# Patient Record
Sex: FEMALE | Race: WHITE | NOT HISPANIC OR LATINO | Employment: PART TIME | URBAN - METROPOLITAN AREA
[De-identification: names, ages, dates, MRNs, and addresses within clinical notes are randomized per-mention and may not be internally consistent; named-entity substitution may affect disease eponyms.]

---

## 2017-04-11 ENCOUNTER — OFFICE VISIT (OUTPATIENT)
Dept: FAMILY MEDICINE CLINIC | Facility: CLINIC | Age: 47
End: 2017-04-11

## 2017-04-11 VITALS
BODY MASS INDEX: 36.63 KG/M2 | SYSTOLIC BLOOD PRESSURE: 116 MMHG | TEMPERATURE: 98.3 F | HEIGHT: 61 IN | HEART RATE: 78 BPM | DIASTOLIC BLOOD PRESSURE: 72 MMHG | WEIGHT: 194 LBS | OXYGEN SATURATION: 98 %

## 2017-04-11 DIAGNOSIS — E66.9 MILDLY OBESE: Primary | ICD-10-CM

## 2017-04-11 DIAGNOSIS — E66.09 EXOGENOUS OBESITY: ICD-10-CM

## 2017-04-11 DIAGNOSIS — F43.0 STRESS REACTION: ICD-10-CM

## 2017-04-11 PROCEDURE — 99213 OFFICE O/P EST LOW 20 MIN: CPT | Performed by: FAMILY MEDICINE

## 2017-04-11 RX ORDER — PHENTERMINE HYDROCHLORIDE 37.5 MG/1
37.5 CAPSULE ORAL EVERY MORNING
Qty: 30 CAPSULE | Refills: 1 | Status: SHIPPED | OUTPATIENT
Start: 2017-04-11 | End: 2017-05-31 | Stop reason: SDUPTHER

## 2017-04-11 NOTE — PROGRESS NOTES
Subjective   Juan Kaur is a 47 y.o. female with   Chief Complaint   Patient presents with   • Obesity     wants to go back on phentermine   .    History of Present Illness     Juan is a 47 yr old white female that presents today for follow up of Exogenous obesity.  She has been working out in a gym but in the last few months, the family stress has increased and she has not been as stringent with her diet.  She has not been using Phentermine in some time.  In these last few months she has also been lax with the use of the gym.  She has noted a 20 pound weight increased in this interim.  She has come to  with the stresses at home and is willing to return to the gym and eat better to take better care of her own body.     The following portions of the patient's history were reviewed and updated as appropriate: allergies, current medications, past family history, past medical history, past social history, past surgical history and problem list.    Review of Systems   Constitutional:        Exogenous obesity   All other systems reviewed and are negative.      Objective     Vitals:    04/11/17 1357   BP: 116/72   Pulse: 78   Temp: 98.3 °F (36.8 °C)   SpO2: 98%     BP Readings from Last 3 Encounters:   04/11/17 116/72   05/26/16 112/68   03/29/16 126/84      Wt Readings from Last 3 Encounters:   04/11/17 194 lb (88 kg)   05/26/16 173 lb (78.5 kg)   03/29/16 181 lb (82.1 kg)        Physical Exam   Constitutional: She is oriented to person, place, and time. She appears well-developed and well-nourished.   HENT:   Head: Normocephalic and atraumatic.   Neck: Trachea normal and phonation normal. Neck supple. Normal carotid pulses present. Carotid bruit is not present. No thyroid mass and no thyromegaly present.   Cardiovascular: Normal rate, regular rhythm and normal heart sounds.  Exam reveals no gallop and no friction rub.    No murmur heard.  Pulmonary/Chest: Effort normal and breath sounds normal. No respiratory  distress. She has no decreased breath sounds. She has no wheezes. She has no rhonchi. She has no rales.   Lymphadenopathy:     She has no cervical adenopathy.   Neurological: She is alert and oriented to person, place, and time.   Skin: Skin is warm and dry. No rash noted.   Psychiatric: She has a normal mood and affect. Her speech is normal and behavior is normal. Judgment and thought content normal. Cognition and memory are normal.   Nursing note and vitals reviewed.      Assessment/Plan   Juan was seen today for obesity.    Diagnoses and all orders for this visit:    Mildly obese  -     phentermine 37.5 MG capsule; Take 1 capsule by mouth Every Morning.    Stress reaction    Exogenous obesity        Return in about 2 months (around 6/11/2017).    Scribed for Catalino Katz MD by Bj Fung. 04/11/2017    ICatalino MD personally performed the services described in this documentation, as scribed by Bj Fung in my presence, and it is both accurate and complete

## 2017-04-12 PROBLEM — E66.09 EXOGENOUS OBESITY: Status: ACTIVE | Noted: 2017-04-12

## 2017-04-12 NOTE — PATIENT INSTRUCTIONS
Long discussion in regards to appropriate diet and exercise pattern.  Patient will be restarted on phentermine with appropriate instructions on use and potential side effects.  Anticipate follow-up in 2 months unless otherwise indicated.

## 2017-05-31 ENCOUNTER — OFFICE VISIT (OUTPATIENT)
Dept: FAMILY MEDICINE CLINIC | Facility: CLINIC | Age: 47
End: 2017-05-31

## 2017-05-31 VITALS
HEIGHT: 61 IN | BODY MASS INDEX: 35.12 KG/M2 | WEIGHT: 186 LBS | TEMPERATURE: 98.2 F | SYSTOLIC BLOOD PRESSURE: 110 MMHG | DIASTOLIC BLOOD PRESSURE: 70 MMHG | HEART RATE: 73 BPM | OXYGEN SATURATION: 100 %

## 2017-05-31 DIAGNOSIS — E66.9 MILDLY OBESE: ICD-10-CM

## 2017-05-31 DIAGNOSIS — E66.09 EXOGENOUS OBESITY: Primary | ICD-10-CM

## 2017-05-31 PROCEDURE — 99213 OFFICE O/P EST LOW 20 MIN: CPT | Performed by: FAMILY MEDICINE

## 2017-05-31 RX ORDER — PHENTERMINE HYDROCHLORIDE 37.5 MG/1
37.5 CAPSULE ORAL EVERY MORNING
Qty: 30 CAPSULE | Refills: 1 | Status: SHIPPED | OUTPATIENT
Start: 2017-05-31 | End: 2019-01-21

## 2017-09-22 ENCOUNTER — CLINICAL SUPPORT (OUTPATIENT)
Dept: FAMILY MEDICINE CLINIC | Facility: CLINIC | Age: 47
End: 2017-09-22

## 2017-09-22 DIAGNOSIS — Z23 NEED FOR INFLUENZA VACCINATION: Primary | ICD-10-CM

## 2017-09-22 PROCEDURE — 90471 IMMUNIZATION ADMIN: CPT | Performed by: FAMILY MEDICINE

## 2017-09-22 PROCEDURE — 90686 IIV4 VACC NO PRSV 0.5 ML IM: CPT | Performed by: FAMILY MEDICINE

## 2017-12-18 ENCOUNTER — APPOINTMENT (OUTPATIENT)
Dept: WOMENS IMAGING | Facility: HOSPITAL | Age: 47
End: 2017-12-18

## 2017-12-18 PROCEDURE — 77067 SCR MAMMO BI INCL CAD: CPT | Performed by: RADIOLOGY

## 2018-10-05 ENCOUNTER — FLU SHOT (OUTPATIENT)
Dept: FAMILY MEDICINE CLINIC | Facility: CLINIC | Age: 48
End: 2018-10-05

## 2018-10-05 DIAGNOSIS — Z23 FLU VACCINE NEED: ICD-10-CM

## 2018-10-05 PROCEDURE — 90674 CCIIV4 VAC NO PRSV 0.5 ML IM: CPT | Performed by: FAMILY MEDICINE

## 2018-10-05 PROCEDURE — 90471 IMMUNIZATION ADMIN: CPT | Performed by: FAMILY MEDICINE

## 2018-11-20 PROCEDURE — 90632 HEPA VACCINE ADULT IM: CPT | Performed by: FAMILY MEDICINE

## 2018-11-20 PROCEDURE — 90471 IMMUNIZATION ADMIN: CPT | Performed by: FAMILY MEDICINE

## 2018-12-27 DIAGNOSIS — E55.9 VITAMIN D DEFICIENCY: ICD-10-CM

## 2018-12-27 DIAGNOSIS — Z00.00 ROUTINE HEALTH MAINTENANCE: ICD-10-CM

## 2018-12-27 DIAGNOSIS — Z00.00 ENCOUNTER FOR WELL ADULT EXAM WITHOUT ABNORMAL FINDINGS: Primary | ICD-10-CM

## 2019-01-11 LAB
25(OH)D3+25(OH)D2 SERPL-MCNC: 18.6 NG/ML
ALBUMIN SERPL-MCNC: 4.3 G/DL (ref 3.5–5.2)
ALBUMIN/GLOB SERPL: 1.5 G/DL
ALP SERPL-CCNC: 84 U/L (ref 40–129)
ALT SERPL-CCNC: 22 U/L (ref 5–33)
AST SERPL-CCNC: 16 U/L (ref 5–32)
BASOPHILS # BLD AUTO: 0.04 10*3/MM3 (ref 0–0.2)
BASOPHILS NFR BLD AUTO: 0.5 % (ref 0–2)
BILIRUB SERPL-MCNC: 0.7 MG/DL (ref 0.2–1.2)
BUN SERPL-MCNC: 17 MG/DL (ref 6–20)
BUN/CREAT SERPL: 21 (ref 7–25)
CALCIUM SERPL-MCNC: 9.1 MG/DL (ref 8.6–10.5)
CHLORIDE SERPL-SCNC: 103 MMOL/L (ref 98–107)
CHOLEST SERPL-MCNC: 206 MG/DL (ref 0–200)
CO2 SERPL-SCNC: 26.5 MMOL/L (ref 22–29)
CREAT SERPL-MCNC: 0.81 MG/DL (ref 0.57–1)
EOSINOPHIL # BLD AUTO: 0.36 10*3/MM3 (ref 0.1–0.3)
EOSINOPHIL NFR BLD AUTO: 4.5 % (ref 0–4)
ERYTHROCYTE [DISTWIDTH] IN BLOOD BY AUTOMATED COUNT: 12.6 % (ref 11.5–14.5)
GLOBULIN SER CALC-MCNC: 2.9 GM/DL
GLUCOSE SERPL-MCNC: 78 MG/DL (ref 65–99)
HCT VFR BLD AUTO: 44.1 % (ref 37–47)
HDLC SERPL-MCNC: 66 MG/DL (ref 40–60)
HGB BLD-MCNC: 14.9 G/DL (ref 12–16)
IMM GRANULOCYTES # BLD AUTO: 0.01 10*3/MM3 (ref 0–0.03)
IMM GRANULOCYTES NFR BLD AUTO: 0.1 % (ref 0–0.5)
LDLC SERPL CALC-MCNC: 120 MG/DL (ref 0–100)
LYMPHOCYTES # BLD AUTO: 1.83 10*3/MM3 (ref 0.6–4.8)
LYMPHOCYTES NFR BLD AUTO: 23 % (ref 20–45)
MCH RBC QN AUTO: 29.7 PG (ref 27–31)
MCHC RBC AUTO-ENTMCNC: 33.8 G/DL (ref 31–37)
MCV RBC AUTO: 88 FL (ref 81–99)
MONOCYTES # BLD AUTO: 0.54 10*3/MM3 (ref 0–1)
MONOCYTES NFR BLD AUTO: 6.8 % (ref 3–8)
NEUTROPHILS # BLD AUTO: 5.16 10*3/MM3 (ref 1.5–8.3)
NEUTROPHILS NFR BLD AUTO: 65.1 % (ref 45–70)
NRBC BLD AUTO-RTO: 0 /100 WBC (ref 0–0)
PLATELET # BLD AUTO: 282 10*3/MM3 (ref 140–500)
POTASSIUM SERPL-SCNC: 4.6 MMOL/L (ref 3.5–5.2)
PROT SERPL-MCNC: 7.2 G/DL (ref 6–8.5)
RBC # BLD AUTO: 5.01 10*6/MM3 (ref 4.2–5.4)
SODIUM SERPL-SCNC: 140 MMOL/L (ref 136–145)
TRIGL SERPL-MCNC: 99 MG/DL (ref 0–150)
TSH SERPL DL<=0.005 MIU/L-ACNC: 2.32 MIU/ML (ref 0.27–4.2)
VLDLC SERPL CALC-MCNC: 19.8 MG/DL (ref 7–27)
WBC # BLD AUTO: 7.94 10*3/MM3 (ref 4.8–10.8)

## 2019-01-21 ENCOUNTER — OFFICE VISIT (OUTPATIENT)
Dept: FAMILY MEDICINE CLINIC | Facility: CLINIC | Age: 49
End: 2019-01-21

## 2019-01-21 VITALS
HEIGHT: 61 IN | BODY MASS INDEX: 37.76 KG/M2 | OXYGEN SATURATION: 97 % | WEIGHT: 200 LBS | SYSTOLIC BLOOD PRESSURE: 122 MMHG | RESPIRATION RATE: 16 BRPM | HEART RATE: 74 BPM | TEMPERATURE: 98.4 F | DIASTOLIC BLOOD PRESSURE: 70 MMHG

## 2019-01-21 DIAGNOSIS — E78.2 MIXED HYPERLIPIDEMIA: ICD-10-CM

## 2019-01-21 DIAGNOSIS — E55.9 VITAMIN D DEFICIENCY: ICD-10-CM

## 2019-01-21 DIAGNOSIS — J30.1 SEASONAL ALLERGIC RHINITIS DUE TO POLLEN: ICD-10-CM

## 2019-01-21 DIAGNOSIS — E66.09 EXOGENOUS OBESITY: ICD-10-CM

## 2019-01-21 DIAGNOSIS — K21.9 GASTROESOPHAGEAL REFLUX DISEASE WITHOUT ESOPHAGITIS: ICD-10-CM

## 2019-01-21 DIAGNOSIS — Z00.01 ENCOUNTER FOR WELL ADULT EXAM WITH ABNORMAL FINDINGS: Primary | ICD-10-CM

## 2019-01-21 LAB
BILIRUB BLD-MCNC: NEGATIVE MG/DL
CLARITY, POC: CLEAR
COLOR UR: YELLOW
GLUCOSE UR STRIP-MCNC: NEGATIVE MG/DL
KETONES UR QL: NEGATIVE
LEUKOCYTE EST, POC: NEGATIVE
NITRITE UR-MCNC: NEGATIVE MG/ML
PH UR: 6 [PH] (ref 5–8)
PROT UR STRIP-MCNC: NEGATIVE MG/DL
RBC # UR STRIP: ABNORMAL /UL
SP GR UR: 1.01 (ref 1–1.03)
UROBILINOGEN UR QL: NORMAL

## 2019-01-21 PROCEDURE — 81002 URINALYSIS NONAUTO W/O SCOPE: CPT | Performed by: FAMILY MEDICINE

## 2019-01-21 PROCEDURE — 99396 PREV VISIT EST AGE 40-64: CPT | Performed by: FAMILY MEDICINE

## 2019-01-21 NOTE — PROGRESS NOTES
Subjective   Juan Kaur is a 49 y.o. female with   Chief Complaint   Patient presents with   • Annual Exam     review labs   .    History of Present Illness   49-year-old white female here for routine complete physical exam.  Patient  with trouble in regards to weight.  A long history of exogenous obesity and through the holidays did not do well.  She is preparing to return to the gym and to eat better.  She has a past history of GERD, and allergic rhinitis.  Medications include Zyrtec during her allergy season and over-the-counter probiotics.  She has her own GYN and will be attending visit to this person in the near future.  The GYN does perform her mammograms.  She is still has menses although they have become him what sporadic.  She is currently otherwise doing well and has no acute complaints.  The following portions of the patient's history were reviewed and updated as appropriate: allergies, current medications, past family history, past medical history, past social history, past surgical history and problem list.    Review of Systems   Constitutional:        Exogenous obesity   Gastrointestinal:        GERD   Allergic/Immunologic: Positive for environmental allergies.       Objective     Vitals:    01/21/19 0812   BP: 122/70   Pulse: 74   Resp: 16   Temp: 98.4 °F (36.9 °C)   SpO2: 97%       Recent Results (from the past 168 hour(s))   POCT urinalysis dipstick, manual    Collection Time: 01/21/19  8:27 AM   Result Value Ref Range    Color Yellow Yellow, Straw, Dark Yellow, Zaida    Clarity, UA Clear Clear    Glucose, UA Negative Negative, 1000 mg/dL (3+) mg/dL    Bilirubin Negative Negative    Ketones, UA Negative Negative    Specific Gravity  1.010 1.005 - 1.030    Blood, UA Trace (A) Negative    pH, Urine 6.0 5.0 - 8.0    Protein, POC Negative Negative mg/dL    Urobilinogen, UA Normal Normal    Leukocytes Negative Negative    Nitrite, UA Negative Negative       Physical Exam   Constitutional: She is  oriented to person, place, and time. Vital signs are normal. She appears well-developed and well-nourished. No distress.   HENT:   Head: Normocephalic and atraumatic.   Right Ear: Hearing, tympanic membrane, external ear and ear canal normal.   Left Ear: Hearing, tympanic membrane, external ear and ear canal normal.   Nose: Nose normal.   Mouth/Throat: Uvula is midline, oropharynx is clear and moist and mucous membranes are normal.   Eyes: Conjunctivae, EOM and lids are normal. Pupils are equal, round, and reactive to light. No scleral icterus.   Fundoscopic exam:       The right eye shows no AV nicking, no exudate, no hemorrhage and no papilledema.        The left eye shows no AV nicking, no exudate, no hemorrhage and no papilledema.   Neck: Trachea normal and normal range of motion. Neck supple. No JVD present. No muscular tenderness present. Carotid bruit is not present. Normal range of motion present. No thyroid mass and no thyromegaly present.   Cardiovascular: Normal rate, regular rhythm, S1 normal, S2 normal, normal heart sounds, intact distal pulses and normal pulses. PMI is not displaced. Exam reveals no gallop and no friction rub.   No murmur heard.  Pulses:       Carotid pulses are 2+ on the right side, and 2+ on the left side.       Radial pulses are 2+ on the right side, and 2+ on the left side.   Pulmonary/Chest: Effort normal and breath sounds normal. She has no decreased breath sounds. She has no wheezes. She has no rhonchi. She has no rales.   Abdominal: Soft. Bowel sounds are normal. She exhibits no distension and no mass. There is no hepatosplenomegaly. There is no tenderness. There is no rigidity, no rebound, no guarding and no CVA tenderness. A hernia is present.   Musculoskeletal: Normal range of motion. She exhibits no edema, tenderness or deformity.   Lymphadenopathy:     She has no cervical adenopathy.   Neurological: She is alert and oriented to person, place, and time. She has normal  strength and normal reflexes. She displays no tremor and normal reflexes. No cranial nerve deficit or sensory deficit. She exhibits normal muscle tone. She displays a negative Romberg sign. Coordination and gait normal.   Reflex Scores:       Bicep reflexes are 2+ on the right side and 2+ on the left side.       Brachioradialis reflexes are 2+ on the right side and 2+ on the left side.       Patellar reflexes are 2+ on the right side and 2+ on the left side.  Skin: Skin is warm and dry. No rash noted.   Psychiatric: She has a normal mood and affect. Her speech is normal and behavior is normal. Judgment and thought content normal. Cognition and memory are normal.   Nursing note and vitals reviewed.    Office Visit on 01/21/2019   Component Date Value Ref Range Status   • Color 01/21/2019 Yellow  Yellow, Straw, Dark Yellow, Zaida Final   • Clarity, UA 01/21/2019 Clear  Clear Final   • Glucose, UA 01/21/2019 Negative  Negative, 1000 mg/dL (3+) mg/dL Final   • Bilirubin 01/21/2019 Negative  Negative Final   • Ketones, UA 01/21/2019 Negative  Negative Final   • Specific Gravity  01/21/2019 1.010  1.005 - 1.030 Final   • Blood, UA 01/21/2019 Trace* Negative Final   • pH, Urine 01/21/2019 6.0  5.0 - 8.0 Final   • Protein, POC 01/21/2019 Negative  Negative mg/dL Final   • Urobilinogen, UA 01/21/2019 Normal  Normal Final   • Leukocytes 01/21/2019 Negative  Negative Final   • Nitrite, UA 01/21/2019 Negative  Negative Final   Orders Only on 12/27/2018   Component Date Value Ref Range Status   • WBC 01/11/2019 7.94  4.80 - 10.80 10*3/mm3 Final   • RBC 01/11/2019 5.01  4.20 - 5.40 10*6/mm3 Final   • Hemoglobin 01/11/2019 14.9  12.0 - 16.0 g/dL Final   • Hematocrit 01/11/2019 44.1  37.0 - 47.0 % Final   • MCV 01/11/2019 88.0  81.0 - 99.0 fL Final   • MCH 01/11/2019 29.7  27.0 - 31.0 pg Final   • MCHC 01/11/2019 33.8  31.0 - 37.0 g/dL Final   • RDW 01/11/2019 12.6  11.5 - 14.5 % Final   • Platelets 01/11/2019 282  140 - 500  10*3/mm3 Final   • Neutrophil Rel % 01/11/2019 65.1  45.0 - 70.0 % Final   • Lymphocyte Rel % 01/11/2019 23.0  20.0 - 45.0 % Final   • Monocyte Rel % 01/11/2019 6.8  3.0 - 8.0 % Final   • Eosinophil Rel % 01/11/2019 4.5* 0.0 - 4.0 % Final   • Basophil Rel % 01/11/2019 0.5  0.0 - 2.0 % Final   • Neutrophils Absolute 01/11/2019 5.16  1.50 - 8.30 10*3/mm3 Final   • Lymphocytes Absolute 01/11/2019 1.83  0.60 - 4.80 10*3/mm3 Final   • Monocytes Absolute 01/11/2019 0.54  0.00 - 1.00 10*3/mm3 Final   • Eosinophils Absolute 01/11/2019 0.36* 0.10 - 0.30 10*3/mm3 Final   • Basophils Absolute 01/11/2019 0.04  0.00 - 0.20 10*3/mm3 Final   • Immature Granulocyte Rel % 01/11/2019 0.1  0.0 - 0.5 % Final   • Immature Grans Absolute 01/11/2019 0.01  0.00 - 0.03 10*3/mm3 Final   • nRBC 01/11/2019 0.0  0.0 - 0.0 /100 WBC Final   • Glucose 01/11/2019 78  65 - 99 mg/dL Final   • BUN 01/11/2019 17  6 - 20 mg/dL Final   • Creatinine 01/11/2019 0.81  0.57 - 1.00 mg/dL Final   • eGFR Non African Am 01/11/2019 75  >60 mL/min/1.73 Final   • eGFR African Am 01/11/2019 91  >60 mL/min/1.73 Final   • BUN/Creatinine Ratio 01/11/2019 21.0  7.0 - 25.0 Final   • Sodium 01/11/2019 140  136 - 145 mmol/L Final   • Potassium 01/11/2019 4.6  3.5 - 5.2 mmol/L Final   • Chloride 01/11/2019 103  98 - 107 mmol/L Final   • Total CO2 01/11/2019 26.5  22.0 - 29.0 mmol/L Final   • Calcium 01/11/2019 9.1  8.6 - 10.5 mg/dL Final   • Total Protein 01/11/2019 7.2  6.0 - 8.5 g/dL Final   • Albumin 01/11/2019 4.30  3.50 - 5.20 g/dL Final   • Globulin 01/11/2019 2.9  gm/dL Final   • A/G Ratio 01/11/2019 1.5  g/dL Final   • Total Bilirubin 01/11/2019 0.7  0.2 - 1.2 mg/dL Final   • Alkaline Phosphatase 01/11/2019 84  40 - 129 U/L Final   • AST (SGOT) 01/11/2019 16  5 - 32 U/L Final   • ALT (SGPT) 01/11/2019 22  5 - 33 U/L Final   • Total Cholesterol 01/11/2019 206* 0 - 200 mg/dL Final   • Triglycerides 01/11/2019 99  0 - 150 mg/dL Final   • HDL Cholesterol 01/11/2019 66*  40 - 60 mg/dL Final   • VLDL Cholesterol 01/11/2019 19.8  7 - 27 mg/dL Final   • LDL Cholesterol  01/11/2019 120* 0 - 100 mg/dL Final   • TSH 01/11/2019 2.320  0.270 - 4.200 mIU/mL Final   • 25 Hydroxy, Vitamin D 01/11/2019 18.6  ng/ml Final    Comment: Reference Range for Total Vitamin D 25(OH)  Deficiency <20.0 ng/mL  Insufficiency 21-29 ng/mL  Sufficiency  ng/mL  Toxicity >100 ng/mL           Assessment/Plan   Juan was seen today for annual exam.    Diagnoses and all orders for this visit:    Encounter for well adult exam with abnormal findings  -     POCT urinalysis dipstick, manual  -     Urine Culture - Urine, Urine, Clean Catch  -     CBC & Differential; Future  -     Comprehensive Metabolic Panel; Future    Exogenous obesity  -     CBC & Differential; Future  -     Comprehensive Metabolic Panel; Future  -     TSH; Future    Gastroesophageal reflux disease without esophagitis  -     CBC & Differential; Future  -     Comprehensive Metabolic Panel; Future    Seasonal allergic rhinitis due to pollen  -     CBC & Differential; Future  -     Comprehensive Metabolic Panel; Future    Vitamin D deficiency  -     CBC & Differential; Future  -     Comprehensive Metabolic Panel; Future  -     Vitamin D 25 Hydroxy; Future    Mixed hyperlipidemia  -     CBC & Differential; Future  -     Comprehensive Metabolic Panel; Future  -     Lipid Panel; Future        Return in about 6 months (around 7/21/2019) for Recheck.

## 2019-01-23 LAB
BACTERIA UR CULT: ABNORMAL
BACTERIA UR CULT: ABNORMAL

## 2019-01-23 RX ORDER — AMPICILLIN 500 MG/1
500 CAPSULE ORAL 4 TIMES DAILY
Qty: 20 CAPSULE | Refills: 0 | Status: SHIPPED | OUTPATIENT
Start: 2019-01-23 | End: 2019-08-13

## 2019-07-20 ENCOUNTER — RESULTS ENCOUNTER (OUTPATIENT)
Dept: FAMILY MEDICINE CLINIC | Facility: CLINIC | Age: 49
End: 2019-07-20

## 2019-07-20 DIAGNOSIS — J30.1 SEASONAL ALLERGIC RHINITIS DUE TO POLLEN: ICD-10-CM

## 2019-07-20 DIAGNOSIS — E66.09 EXOGENOUS OBESITY: ICD-10-CM

## 2019-07-20 DIAGNOSIS — Z00.01 ENCOUNTER FOR WELL ADULT EXAM WITH ABNORMAL FINDINGS: ICD-10-CM

## 2019-07-20 DIAGNOSIS — K21.9 GASTROESOPHAGEAL REFLUX DISEASE WITHOUT ESOPHAGITIS: ICD-10-CM

## 2019-07-20 DIAGNOSIS — E78.2 MIXED HYPERLIPIDEMIA: ICD-10-CM

## 2019-07-20 DIAGNOSIS — E55.9 VITAMIN D DEFICIENCY: ICD-10-CM

## 2019-08-13 ENCOUNTER — OFFICE VISIT (OUTPATIENT)
Dept: FAMILY MEDICINE CLINIC | Facility: CLINIC | Age: 49
End: 2019-08-13

## 2019-08-13 VITALS
HEIGHT: 61 IN | HEART RATE: 79 BPM | SYSTOLIC BLOOD PRESSURE: 122 MMHG | TEMPERATURE: 98 F | DIASTOLIC BLOOD PRESSURE: 74 MMHG | WEIGHT: 191 LBS | BODY MASS INDEX: 36.06 KG/M2 | OXYGEN SATURATION: 98 %

## 2019-08-13 DIAGNOSIS — L28.0 LICHEN SIMPLEX CHRONICUS: ICD-10-CM

## 2019-08-13 DIAGNOSIS — B37.31 VAGINAL MONILIASIS: ICD-10-CM

## 2019-08-13 DIAGNOSIS — R30.0 DYSURIA: Primary | ICD-10-CM

## 2019-08-13 LAB
BILIRUB BLD-MCNC: ABNORMAL MG/DL
CLARITY, POC: CLEAR
COLOR UR: ABNORMAL
GLUCOSE UR STRIP-MCNC: NEGATIVE MG/DL
KETONES UR QL: ABNORMAL
LEUKOCYTE EST, POC: NEGATIVE
NITRITE UR-MCNC: NEGATIVE MG/ML
PH UR: 5 [PH] (ref 5–8)
PROT UR STRIP-MCNC: ABNORMAL MG/DL
RBC # UR STRIP: NEGATIVE /UL
SP GR UR: 1.02 (ref 1–1.03)
UROBILINOGEN UR QL: NORMAL

## 2019-08-13 PROCEDURE — 81002 URINALYSIS NONAUTO W/O SCOPE: CPT | Performed by: FAMILY MEDICINE

## 2019-08-13 PROCEDURE — 99213 OFFICE O/P EST LOW 20 MIN: CPT | Performed by: FAMILY MEDICINE

## 2019-08-13 RX ORDER — FLUCONAZOLE 150 MG/1
150 TABLET ORAL ONCE
Qty: 1 TABLET | Refills: 0 | Status: SHIPPED | OUTPATIENT
Start: 2019-08-13 | End: 2019-08-13

## 2019-08-13 RX ORDER — BETAMETHASONE DIPROPIONATE 0.5 MG/G
CREAM TOPICAL 2 TIMES DAILY
COMMUNITY
End: 2020-01-20

## 2019-08-14 NOTE — PATIENT INSTRUCTIONS
Diflucan will be prescribed to take 1 times today.  She will also return to betamethasone cream which she has plenty of at the house.  Urine culture will also be obtained to cover any infectious urinary tract issues.

## 2019-08-14 NOTE — PROGRESS NOTES
Subjective   Juan Kaur is a 49 y.o. female with   Chief Complaint   Patient presents with   • Difficulty Urinating   • Abdominal Cramping   • Vaginal Itching     had a cream from GYN in the past, betamethasone cream  0.05%, uses off and on   .    History of Present Illness   49-year-old white female with perivaginal pruritus especially in the superior portion of the pubic hair exist.  She has been given betamethasone cream in the past by her GYN.  She denies vaginal discharge and states that there is no intravaginal pruritus.  She does have some lower abdominal cramping and on occasion will have episodes of dysuria that seem to be at the end of micturition.  This is however not the norm and she also denies hematuria, frequency or urgency.  The following portions of the patient's history were reviewed and updated as appropriate: allergies, current medications, past family history, past medical history, past social history, past surgical history and problem list.    Review of Systems   Genitourinary: Positive for dysuria.        Perivaginal pruritus       Objective     Vitals:    08/13/19 0812   BP: 122/74   Pulse: 79   Temp: 98 °F (36.7 °C)   SpO2: 98%       Recent Results (from the past 672 hour(s))   POC Urinalysis Dipstick    Collection Time: 08/13/19  8:23 AM   Result Value Ref Range    Color Straw Yellow, Straw, Dark Yellow, Zaida    Clarity, UA Clear Clear    Glucose, UA Negative Negative, 1000 mg/dL (3+) mg/dL    Bilirubin 1 mg/dL (A) Negative    Ketones, UA 1+ (A) Negative    Specific Gravity  1.025 1.005 - 1.030    Blood, UA Negative Negative    pH, Urine 5.0 5.0 - 8.0    Protein, POC Trace (A) Negative mg/dL    Urobilinogen, UA Normal Normal    Leukocytes Negative Negative    Nitrite, UA Negative Negative       Physical Exam   Constitutional: She is oriented to person, place, and time. She appears well-developed and well-nourished.   HENT:   Head: Normocephalic and atraumatic.   Neck: Trachea normal  and phonation normal. Neck supple. Normal carotid pulses present. Carotid bruit is not present. No thyroid mass and no thyromegaly present.   Cardiovascular: Normal rate, regular rhythm and normal heart sounds. Exam reveals no gallop and no friction rub.   No murmur heard.  Pulmonary/Chest: Effort normal and breath sounds normal. No respiratory distress. She has no decreased breath sounds. She has no wheezes. She has no rhonchi. She has no rales.   Lymphadenopathy:     She has no cervical adenopathy.   Neurological: She is alert and oriented to person, place, and time.   Skin: Skin is warm and dry. No rash noted.   Psychiatric: She has a normal mood and affect. Her speech is normal and behavior is normal. Judgment and thought content normal. Cognition and memory are normal.   Nursing note and vitals reviewed.      Assessment/Plan   Juan was seen today for difficulty urinating, abdominal cramping and vaginal itching.    Diagnoses and all orders for this visit:    Dysuria  -     POC Urinalysis Dipstick  -     Urine Culture - Urine, Urine, Clean Catch    Vaginal moniliasis    Lichen simplex chronicus    Other orders  -     fluconazole (DIFLUCAN) 150 MG tablet; Take 1 tablet by mouth 1 (One) Time for 1 dose.        Return if symptoms worsen or fail to improve.

## 2019-08-15 LAB
BACTERIA UR CULT: NORMAL
BACTERIA UR CULT: NORMAL

## 2019-09-26 ENCOUNTER — FLU SHOT (OUTPATIENT)
Dept: FAMILY MEDICINE CLINIC | Facility: CLINIC | Age: 49
End: 2019-09-26

## 2019-09-26 DIAGNOSIS — Z23 NEED FOR INFLUENZA VACCINATION: Primary | ICD-10-CM

## 2019-09-26 DIAGNOSIS — Z23 FLU VACCINE NEED: ICD-10-CM

## 2019-09-26 PROCEDURE — 90471 IMMUNIZATION ADMIN: CPT | Performed by: FAMILY MEDICINE

## 2019-09-26 PROCEDURE — 90674 CCIIV4 VAC NO PRSV 0.5 ML IM: CPT | Performed by: FAMILY MEDICINE

## 2019-10-31 ENCOUNTER — APPOINTMENT (OUTPATIENT)
Dept: WOMENS IMAGING | Facility: HOSPITAL | Age: 49
End: 2019-10-31

## 2019-11-05 PROCEDURE — 77067 SCR MAMMO BI INCL CAD: CPT | Performed by: RADIOLOGY

## 2020-01-11 LAB
25(OH)D3+25(OH)D2 SERPL-MCNC: 49.7 NG/ML (ref 30–100)
ALBUMIN SERPL-MCNC: 4 G/DL (ref 3.5–5.5)
ALBUMIN/GLOB SERPL: 1.5 {RATIO} (ref 1.2–2.2)
ALP SERPL-CCNC: 75 IU/L (ref 39–117)
ALT SERPL-CCNC: 17 IU/L (ref 0–32)
AST SERPL-CCNC: 18 IU/L (ref 0–40)
BASOPHILS # BLD AUTO: 0 X10E3/UL (ref 0–0.2)
BASOPHILS NFR BLD AUTO: 0 %
BILIRUB SERPL-MCNC: 0.6 MG/DL (ref 0–1.2)
BUN SERPL-MCNC: 15 MG/DL (ref 6–24)
BUN/CREAT SERPL: 16 (ref 9–23)
CALCIUM SERPL-MCNC: 9.4 MG/DL (ref 8.7–10.2)
CHLORIDE SERPL-SCNC: 104 MMOL/L (ref 96–106)
CHOLEST SERPL-MCNC: 191 MG/DL (ref 100–199)
CO2 SERPL-SCNC: 25 MMOL/L (ref 20–29)
CREAT SERPL-MCNC: 0.96 MG/DL (ref 0.57–1)
EOSINOPHIL # BLD AUTO: 0.3 X10E3/UL (ref 0–0.4)
EOSINOPHIL NFR BLD AUTO: 3 %
ERYTHROCYTE [DISTWIDTH] IN BLOOD BY AUTOMATED COUNT: 12.4 % (ref 11.7–15.4)
GLOBULIN SER CALC-MCNC: 2.6 G/DL (ref 1.5–4.5)
GLUCOSE SERPL-MCNC: 77 MG/DL (ref 65–99)
HCT VFR BLD AUTO: 42.2 % (ref 34–46.6)
HDLC SERPL-MCNC: 59 MG/DL
HGB BLD-MCNC: 14.1 G/DL (ref 11.1–15.9)
IMM GRANULOCYTES # BLD AUTO: 0 X10E3/UL (ref 0–0.1)
IMM GRANULOCYTES NFR BLD AUTO: 0 %
LDLC SERPL CALC-MCNC: 119 MG/DL (ref 0–99)
LYMPHOCYTES # BLD AUTO: 1.6 X10E3/UL (ref 0.7–3.1)
LYMPHOCYTES NFR BLD AUTO: 20 %
MCH RBC QN AUTO: 29.3 PG (ref 26.6–33)
MCHC RBC AUTO-ENTMCNC: 33.4 G/DL (ref 31.5–35.7)
MCV RBC AUTO: 88 FL (ref 79–97)
MONOCYTES # BLD AUTO: 0.7 X10E3/UL (ref 0.1–0.9)
MONOCYTES NFR BLD AUTO: 8 %
NEUTROPHILS # BLD AUTO: 5.6 X10E3/UL (ref 1.4–7)
NEUTROPHILS NFR BLD AUTO: 69 %
PLATELET # BLD AUTO: 288 X10E3/UL (ref 150–450)
POTASSIUM SERPL-SCNC: 4.6 MMOL/L (ref 3.5–5.2)
PROT SERPL-MCNC: 6.6 G/DL (ref 6–8.5)
RBC # BLD AUTO: 4.81 X10E6/UL (ref 3.77–5.28)
SODIUM SERPL-SCNC: 143 MMOL/L (ref 134–144)
TRIGL SERPL-MCNC: 66 MG/DL (ref 0–149)
TSH SERPL DL<=0.005 MIU/L-ACNC: 2.02 UIU/ML (ref 0.45–4.5)
VLDLC SERPL CALC-MCNC: 13 MG/DL (ref 5–40)
WBC # BLD AUTO: 8.2 X10E3/UL (ref 3.4–10.8)

## 2020-01-20 ENCOUNTER — OFFICE VISIT (OUTPATIENT)
Dept: FAMILY MEDICINE CLINIC | Facility: CLINIC | Age: 50
End: 2020-01-20

## 2020-01-20 VITALS
TEMPERATURE: 98 F | HEIGHT: 61 IN | SYSTOLIC BLOOD PRESSURE: 124 MMHG | OXYGEN SATURATION: 99 % | WEIGHT: 183 LBS | BODY MASS INDEX: 34.55 KG/M2 | DIASTOLIC BLOOD PRESSURE: 80 MMHG | HEART RATE: 69 BPM

## 2020-01-20 DIAGNOSIS — F43.21 GRIEVING: ICD-10-CM

## 2020-01-20 DIAGNOSIS — F43.0 STRESS REACTION: ICD-10-CM

## 2020-01-20 DIAGNOSIS — J06.9 VIRAL URI WITH COUGH: Primary | ICD-10-CM

## 2020-01-20 DIAGNOSIS — E78.2 MIXED HYPERLIPIDEMIA: ICD-10-CM

## 2020-01-20 DIAGNOSIS — E66.09 EXOGENOUS OBESITY: ICD-10-CM

## 2020-01-20 DIAGNOSIS — E55.9 VITAMIN D DEFICIENCY: ICD-10-CM

## 2020-01-20 PROCEDURE — 99214 OFFICE O/P EST MOD 30 MIN: CPT | Performed by: FAMILY MEDICINE

## 2020-01-20 RX ORDER — FLUTICASONE PROPIONATE 50 MCG
2 SPRAY, SUSPENSION (ML) NASAL DAILY
COMMUNITY

## 2020-01-20 RX ORDER — ESTRADIOL 0.1 MG/G
CREAM VAGINAL
COMMUNITY
Start: 2019-10-31

## 2020-01-21 NOTE — PROGRESS NOTES
Subjective   Juan Kaur is a 50 y.o. female with   Chief Complaint   Patient presents with   • Hyperlipidemia   • Nasal Congestion   • Cough   • Earache   .    History of Present Illness   50-year-old white female with nasal congestion, postnasal drip and increased cough over the last 4 to 5 days.  Patient denies fever and there has been no chest pain, shortness of air or audible wheezing.  She has had some ear discomfort in the right ear although denies drainage.  Cough for the most part has been nonproductive.  Patient denies myalgias and there have been no issues with nausea, vomiting or diarrhea.  Patient also with a history of hyperlipidemia here in follow-up with fasting labs drawn prior to this appointment.  Patient has been attempting to control lipid status with diet alone.  She is still struggling with the loss of her brother who passed away 4 to 5 months ago.  Her father has end-stage Parkinson's disease and is severely demented.  She understands she may also be facing his demise in the not too distant future.  She has 1 other brother who lives in the [facility] and she rarely sees him.  She is somewhat the sole support for her mother who is also grieving the death of her son.  She does wonder if she needs to be on an antidepressant although with further conversation realizes that she is not suffering from depression.  Patient denies suicidal or homicidal ideation.  The following portions of the patient's history were reviewed and updated as appropriate: allergies, current medications, past family history, past medical history, past social history, past surgical history and problem list.    Review of Systems   Constitutional: Positive for fatigue. Negative for fever.   HENT: Positive for congestion, ear pain and postnasal drip. Negative for ear discharge and sore throat.    Respiratory: Positive for cough. Negative for shortness of breath and wheezing.    Cardiovascular: Negative for chest  pain.   Gastrointestinal: Negative for diarrhea, nausea and vomiting.   Musculoskeletal: Negative for myalgias.   Psychiatric/Behavioral: Positive for dysphoric mood.       Objective     Vitals:    01/20/20 1017   BP: 124/80   Pulse: 69   Temp: 98 °F (36.7 °C)   SpO2: 99%       Recent Results (from the past 672 hour(s))   CBC & Differential    Collection Time: 01/10/20  9:14 AM   Result Value Ref Range    WBC 8.2 3.4 - 10.8 x10E3/uL    RBC 4.81 3.77 - 5.28 x10E6/uL    Hemoglobin 14.1 11.1 - 15.9 g/dL    Hematocrit 42.2 34.0 - 46.6 %    MCV 88 79 - 97 fL    MCH 29.3 26.6 - 33.0 pg    MCHC 33.4 31.5 - 35.7 g/dL    RDW 12.4 11.7 - 15.4 %    Platelets 288 150 - 450 x10E3/uL    Neutrophil Rel % 69 Not Estab. %    Lymphocyte Rel % 20 Not Estab. %    Monocyte Rel % 8 Not Estab. %    Eosinophil Rel % 3 Not Estab. %    Basophil Rel % 0 Not Estab. %    Neutrophils Absolute 5.6 1.4 - 7.0 x10E3/uL    Lymphocytes Absolute 1.6 0.7 - 3.1 x10E3/uL    Monocytes Absolute 0.7 0.1 - 0.9 x10E3/uL    Eosinophils Absolute 0.3 0.0 - 0.4 x10E3/uL    Basophils Absolute 0.0 0.0 - 0.2 x10E3/uL    Immature Granulocyte Rel % 0 Not Estab. %    Immature Grans Absolute 0.0 0.0 - 0.1 x10E3/uL   Comprehensive Metabolic Panel    Collection Time: 01/10/20  9:14 AM   Result Value Ref Range    Glucose 77 65 - 99 mg/dL    BUN 15 6 - 24 mg/dL    Creatinine 0.96 0.57 - 1.00 mg/dL    eGFR Non African Am 69 >59 mL/min/1.73    eGFR African Am 80 >59 mL/min/1.73    BUN/Creatinine Ratio 16 9 - 23    Sodium 143 134 - 144 mmol/L    Potassium 4.6 3.5 - 5.2 mmol/L    Chloride 104 96 - 106 mmol/L    Total CO2 25 20 - 29 mmol/L    Calcium 9.4 8.7 - 10.2 mg/dL    Total Protein 6.6 6.0 - 8.5 g/dL    Albumin 4.0 3.5 - 5.5 g/dL    Globulin 2.6 1.5 - 4.5 g/dL    A/G Ratio 1.5 1.2 - 2.2    Total Bilirubin 0.6 0.0 - 1.2 mg/dL    Alkaline Phosphatase 75 39 - 117 IU/L    AST (SGOT) 18 0 - 40 IU/L    ALT (SGPT) 17 0 - 32 IU/L   Lipid Panel    Collection Time: 01/10/20  9:14 AM    Result Value Ref Range    Total Cholesterol 191 100 - 199 mg/dL    Triglycerides 66 0 - 149 mg/dL    HDL Cholesterol 59 >39 mg/dL    VLDL Cholesterol 13 5 - 40 mg/dL    LDL Cholesterol  119 (H) 0 - 99 mg/dL   Vitamin D 25 Hydroxy    Collection Time: 01/10/20  9:14 AM   Result Value Ref Range    25 Hydroxy, Vitamin D 49.7 30.0 - 100.0 ng/mL   TSH    Collection Time: 01/10/20  9:14 AM   Result Value Ref Range    TSH 2.020 0.450 - 4.500 uIU/mL       Physical Exam   Constitutional: She is oriented to person, place, and time. She appears well-developed and well-nourished.   Obese with a BMI of 34.6   HENT:   Head: Normocephalic and atraumatic.   Right Ear: Hearing, tympanic membrane, external ear and ear canal normal.   Left Ear: Hearing, tympanic membrane, external ear and ear canal normal.   Nose: Nose normal.   Mouth/Throat: Uvula is midline, oropharynx is clear and moist and mucous membranes are normal.   Neck: Trachea normal and phonation normal. Neck supple. Normal carotid pulses present. Carotid bruit is not present. No thyroid mass and no thyromegaly present.   Cardiovascular: Normal rate, regular rhythm and normal heart sounds. Exam reveals no gallop and no friction rub.   No murmur heard.  Pulmonary/Chest: Effort normal and breath sounds normal. No respiratory distress. She has no decreased breath sounds. She has no wheezes. She has no rhonchi. She has no rales.   Lymphadenopathy:     She has no cervical adenopathy.   Neurological: She is alert and oriented to person, place, and time.   Skin: Skin is warm and dry. No rash noted.   Psychiatric: She has a normal mood and affect. Her speech is normal and behavior is normal. Judgment and thought content normal. Cognition and memory are normal.   Patient cries through much of this interview.   Nursing note and vitals reviewed.      Assessment/Plan   Juan was seen today for hyperlipidemia, nasal congestion, cough and earache.    Diagnoses and all orders for this  visit:    Viral URI with cough    Mixed hyperlipidemia    Exogenous obesity    Vitamin D deficiency    Grieving    Stress reaction    20 minutes of a 30-minute appointment spent counseling in regards to grieving and stress reaction versus depression with anxiety features.  Patient has been recommended to acquire a counselor/grief counselor.  It also is recommended that she lower cholesterol in her diet.  Her efforts at weight loss are appreciated and should continue.  Anticipate next laboratory evaluation in 6 months.  She has been reassured given the viral nature of her respiratory situation and will contact this office and 1 week if symptoms fail to show improvement.    Return in about 6 months (around 7/20/2020) for Recheck.

## 2020-02-03 ENCOUNTER — TELEPHONE (OUTPATIENT)
Dept: FAMILY MEDICINE CLINIC | Facility: CLINIC | Age: 50
End: 2020-02-03

## 2020-02-03 RX ORDER — ESCITALOPRAM OXALATE 10 MG/1
10 TABLET ORAL DAILY
Qty: 30 TABLET | Refills: 0 | Status: SHIPPED | OUTPATIENT
Start: 2020-02-03 | End: 2020-02-26

## 2020-02-03 NOTE — TELEPHONE ENCOUNTER
Pt saw you on the 20th and you had discussed starting her on anxiety medication which she states she declined at the time but has since changed her mind.  Will you go ahead and start her on something for anxiety?

## 2020-02-26 RX ORDER — ESCITALOPRAM OXALATE 10 MG/1
TABLET ORAL
Qty: 30 TABLET | Refills: 0 | Status: SHIPPED | OUTPATIENT
Start: 2020-02-26 | End: 2020-02-26 | Stop reason: SDUPTHER

## 2020-02-26 RX ORDER — ESCITALOPRAM OXALATE 10 MG/1
10 TABLET ORAL DAILY
Qty: 30 TABLET | Refills: 0 | Status: SHIPPED | OUTPATIENT
Start: 2020-02-26 | End: 2020-03-09 | Stop reason: SDUPTHER

## 2020-03-09 ENCOUNTER — OFFICE VISIT (OUTPATIENT)
Dept: FAMILY MEDICINE CLINIC | Facility: CLINIC | Age: 50
End: 2020-03-09

## 2020-03-09 VITALS
HEIGHT: 61 IN | WEIGHT: 181 LBS | SYSTOLIC BLOOD PRESSURE: 124 MMHG | OXYGEN SATURATION: 99 % | DIASTOLIC BLOOD PRESSURE: 80 MMHG | BODY MASS INDEX: 34.17 KG/M2 | HEART RATE: 83 BPM

## 2020-03-09 DIAGNOSIS — F41.1 GENERALIZED ANXIETY DISORDER: Primary | ICD-10-CM

## 2020-03-09 PROCEDURE — 99213 OFFICE O/P EST LOW 20 MIN: CPT | Performed by: FAMILY MEDICINE

## 2020-03-09 RX ORDER — ESCITALOPRAM OXALATE 10 MG/1
10 TABLET ORAL DAILY
Qty: 90 TABLET | Refills: 0 | Status: SHIPPED | OUTPATIENT
Start: 2020-03-09 | End: 2020-06-09 | Stop reason: SDUPTHER

## 2020-03-09 NOTE — PROGRESS NOTES
Subjective   Juan Kaur is a 50 y.o. female with   Chief Complaint   Patient presents with   • Anxiety     follow up Lexapro   .    History of Present Illness   Follow-up 50-year-old white female with history of general anxiety disorder.  Patient is under marked stress with the recent death of a older brother as well as trying to care for her elderly mother and father-father has end-stage Parkinson's disease.  Mother takes care of the father and then requires patient to take care of her.  She has been using Lexapro at 10 mg daily over the last month and notes increase improvement in control of anxiety.  She states she no longer wants to crawl out of her skin and she is able to handle stress in a much improved fashion.  She continues to work full-time and care for her  and children.  Patient denies suicide or homicidal ideation.  She denies any depressed symptoms and states in general she has good energy, motivation and good concentration.  She does participate in activities of usual enjoyment and denies increased agitation or irritability.  She sleeps well and has a normal appetite.  The following portions of the patient's history were reviewed and updated as appropriate: allergies, current medications, past family history, past medical history, past social history, past surgical history and problem list.    Review of Systems   Psychiatric/Behavioral: The patient is nervous/anxious.        Objective     Vitals:    03/09/20 0757   BP: 124/80   Pulse: 83   SpO2: 99%       No results found for this or any previous visit (from the past 672 hour(s)).    Physical Exam   Constitutional: She is oriented to person, place, and time. She appears well-developed and well-nourished.   HENT:   Head: Normocephalic and atraumatic.   Neck: Neck supple.   Neurological: She is alert and oriented to person, place, and time.   Skin: Skin is warm and dry. No rash noted.   Psychiatric: She has a normal mood and affect. Her  speech is normal and behavior is normal. Judgment and thought content normal. Cognition and memory are normal.   Nursing note and vitals reviewed.      Assessment/Plan   Juan was seen today for anxiety.    Diagnoses and all orders for this visit:    Generalized anxiety disorder  -     escitalopram (LEXAPRO) 10 MG tablet; Take 1 tablet by mouth Daily.        Return in about 3 months (around 6/9/2020) for Recheck.

## 2020-06-09 ENCOUNTER — HOSPITAL ENCOUNTER (OUTPATIENT)
Dept: GENERAL RADIOLOGY | Facility: HOSPITAL | Age: 50
Discharge: HOME | End: 2020-06-09
Admitting: FAMILY MEDICINE

## 2020-06-09 ENCOUNTER — OFFICE VISIT (OUTPATIENT)
Dept: FAMILY MEDICINE CLINIC | Facility: CLINIC | Age: 50
End: 2020-06-09

## 2020-06-09 VITALS
WEIGHT: 181 LBS | TEMPERATURE: 97.8 F | HEART RATE: 67 BPM | BODY MASS INDEX: 34.17 KG/M2 | HEIGHT: 61 IN | SYSTOLIC BLOOD PRESSURE: 118 MMHG | DIASTOLIC BLOOD PRESSURE: 78 MMHG | OXYGEN SATURATION: 97 %

## 2020-06-09 DIAGNOSIS — M79.674 PAIN OF RIGHT GREAT TOE: ICD-10-CM

## 2020-06-09 DIAGNOSIS — F41.1 GENERALIZED ANXIETY DISORDER: Primary | ICD-10-CM

## 2020-06-09 PROCEDURE — 73660 X-RAY EXAM OF TOE(S): CPT

## 2020-06-09 PROCEDURE — 99213 OFFICE O/P EST LOW 20 MIN: CPT | Performed by: FAMILY MEDICINE

## 2020-06-09 RX ORDER — ESCITALOPRAM OXALATE 10 MG/1
10 TABLET ORAL DAILY
Qty: 90 TABLET | Refills: 1 | Status: SHIPPED | OUTPATIENT
Start: 2020-06-09 | End: 2020-08-10 | Stop reason: SINTOL

## 2020-06-09 NOTE — PROGRESS NOTES
Subjective   Juan Kaur is a 50 y.o. female with   Chief Complaint   Patient presents with   • Anxiety     follow up Lexapro   • Toe Pain     right great toe   .    History of Present Illness   50-year-old white female with history of general anxiety disorder here for further medical management.  Patient has been using Lexapro at 10 mg daily and in general states that this has been very effective.  Her anxiety has been well controlled even though she has episodes of increased stress.  Her father has end-stage Parkinson's and it is unknown how long he will last.  She is also 1 of the main supports for her mother who is dealing with the above issues.  She lost her brother 1 year ago and was able to recover adequately.  She states there have been no side effects with the use of this medication.  She denies suicidal or homicidal ideation.  In general she has good energy, motivation and good concentration.  She is sleeping well and has a normal appetite.  She denies increased irritability or easy agitation.  She does participate in activities of usual enjoyment.  She also complains of pain at the base of her right great toe.  She does state that this was injured in her distant past and that it has never quite recovered.  There are certain ways that she can walk on her foot that exacerbates this problem.  The following portions of the patient's history were reviewed and updated as appropriate: allergies, current medications, past family history, past medical history, past social history, past surgical history and problem list.    Review of Systems   Musculoskeletal: Positive for arthralgias.   Psychiatric/Behavioral: Negative for dysphoric mood, self-injury, sleep disturbance and suicidal ideas. The patient is nervous/anxious.        Objective     Vitals:    06/09/20 0809   BP: 118/78   Pulse: 67   Temp: 97.8 °F (36.6 °C)   SpO2: 97%       No results found for this or any previous visit (from the past 672  hour(s)).    Physical Exam   Constitutional: She is oriented to person, place, and time. She appears well-developed and well-nourished.   HENT:   Head: Normocephalic and atraumatic.   Neck: Neck supple.   Musculoskeletal:   Right great toe with no evidence of erythema, soft tissue swelling or deformity.  There is minimal tenderness at the metatarsal phalangeal joint.  Motor is 5/5 and sensory is intact to gross palpation.   Neurological: She is alert and oriented to person, place, and time. She has normal strength. No sensory deficit.   Skin: Skin is warm and dry. No rash noted.   Psychiatric: She has a normal mood and affect. Her speech is normal and behavior is normal. Judgment and thought content normal. Cognition and memory are normal.   Nursing note and vitals reviewed.      Assessment/Plan   Juan was seen today for anxiety and toe pain.    Diagnoses and all orders for this visit:    Generalized anxiety disorder  -     escitalopram (LEXAPRO) 10 MG tablet; Take 1 tablet by mouth Daily.    Pain of right great toe  -     XR Toe 2+ View Right (In Office)        Return in about 6 months (around 12/9/2020) for Recheck.

## 2020-08-10 ENCOUNTER — OFFICE VISIT (OUTPATIENT)
Dept: FAMILY MEDICINE CLINIC | Facility: CLINIC | Age: 50
End: 2020-08-10

## 2020-08-10 VITALS
HEIGHT: 61 IN | TEMPERATURE: 97.5 F | HEART RATE: 87 BPM | DIASTOLIC BLOOD PRESSURE: 80 MMHG | OXYGEN SATURATION: 98 % | SYSTOLIC BLOOD PRESSURE: 120 MMHG | BODY MASS INDEX: 35.12 KG/M2 | WEIGHT: 186 LBS

## 2020-08-10 DIAGNOSIS — F41.1 GENERALIZED ANXIETY DISORDER: Primary | ICD-10-CM

## 2020-08-10 PROCEDURE — 99213 OFFICE O/P EST LOW 20 MIN: CPT | Performed by: FAMILY MEDICINE

## 2020-08-10 NOTE — PROGRESS NOTES
Subjective   Juan Kaur is a 50 y.o. female with   Chief Complaint   Patient presents with   • Anxiety     medication follow up   .    History of Present Illness   50-year-old white female here in follow-up in regards to depression with anxiety features.  Patient has been using Lexapro 10 mg daily and feels that she has seen some increase calmness.  She has been helping her mother take care of her father who is under hospice care in regards to end-stage Parkinson's.  This is been extremely hard on patient although she admits that this may be more of a situational issue than an underlying disorder.  Her  wants her to discontinue the this medication as she has had a greater difficult time having orgasms.  She denies suicidal homicidal ideation.  She would like to try to discontinue this product rather than try another 1 and see how she does over time.  The following portions of the patient's history were reviewed and updated as appropriate: allergies, current medications, past family history, past medical history, past social history, past surgical history and problem list.    Review of Systems   Psychiatric/Behavioral: Positive for dysphoric mood. The patient is nervous/anxious.        Objective     Vitals:    08/10/20 1044   BP: 120/80   Pulse: 87   Temp: 97.5 °F (36.4 °C)   SpO2: 98%       No results found for this or any previous visit (from the past 672 hour(s)).    Physical Exam   Constitutional: She is oriented to person, place, and time. She appears well-developed and well-nourished.   HENT:   Head: Normocephalic and atraumatic.   Neck: Neck supple.   Neurological: She is alert and oriented to person, place, and time.   Skin: Skin is warm and dry. No rash noted.   Psychiatric: She has a normal mood and affect. Her speech is normal and behavior is normal. Judgment and thought content normal. Cognition and memory are normal.   Nursing note and vitals reviewed.      Assessment/Plan   Juan was seen  today for anxiety.    Diagnoses and all orders for this visit:    Generalized anxiety disorder    Patient will immediately use Lexapro at 1 tablet every other day over the next 2 weeks and discontinue thereafter.  She will follow-up in this office only if symptoms return or as previously scheduled for other issues.    Return if symptoms worsen or fail to improve.

## 2020-10-27 ENCOUNTER — TELEPHONE (OUTPATIENT)
Dept: FAMILY MEDICINE CLINIC | Facility: CLINIC | Age: 50
End: 2020-10-27

## 2020-10-27 NOTE — TELEPHONE ENCOUNTER
PT CALLED STATING SHE BELIEVES SHE HAS A YEAST INFECTION AND IS REQUESTING FOR SOMETHING TO BE CALLED IN    PREFERRED PHARMACY   [id number] - [place], [place] - [address] [place] [address] - [phone] [facility] [phone]   [phone]

## 2020-10-28 RX ORDER — FLUCONAZOLE 150 MG/1
150 TABLET ORAL ONCE
Qty: 1 TABLET | Refills: 0 | Status: SHIPPED | OUTPATIENT
Start: 2020-10-28 | End: 2020-10-28

## 2021-06-22 ENCOUNTER — OFFICE VISIT (OUTPATIENT)
Dept: FAMILY MEDICINE CLINIC | Facility: CLINIC | Age: 51
End: 2021-06-22

## 2021-06-22 VITALS
OXYGEN SATURATION: 98 % | TEMPERATURE: 98 F | SYSTOLIC BLOOD PRESSURE: 130 MMHG | DIASTOLIC BLOOD PRESSURE: 90 MMHG | WEIGHT: 188 LBS | BODY MASS INDEX: 35.5 KG/M2 | HEIGHT: 61 IN | HEART RATE: 74 BPM

## 2021-06-22 DIAGNOSIS — J30.1 SEASONAL ALLERGIC RHINITIS DUE TO POLLEN: ICD-10-CM

## 2021-06-22 DIAGNOSIS — L70.0 ACNE VULGARIS: ICD-10-CM

## 2021-06-22 DIAGNOSIS — H69.83 DYSFUNCTION OF BOTH EUSTACHIAN TUBES: ICD-10-CM

## 2021-06-22 DIAGNOSIS — H60.543 ECZEMA OF BOTH EXTERNAL EARS: Primary | ICD-10-CM

## 2021-06-22 PROCEDURE — 99214 OFFICE O/P EST MOD 30 MIN: CPT | Performed by: FAMILY MEDICINE

## 2021-06-23 NOTE — PROGRESS NOTES
Subjective   Juan Kaur is a 51 y.o. female with   Chief Complaint   Patient presents with   • Earache      ear pain both ears, approx 2 wks   • Suspicious Skin Lesion     left cheek   .    History of Present Illness   51-year-old white female with bilateral ear discomfort which she describes as popping and crackling as well as pruritus in the distal portions of the external auditory canals bilaterally.  She is also asking for further evaluation of a skin lesion in the left facial cheek area.  Lesion on the face has been present for several weeks and does not seem to be resolving.  Ear symptoms have been present for 2 weeks with known history of seasonal allergic rhinitis.  Spring and summer are usually her worst seasons.  The following portions of the patient's history were reviewed and updated as appropriate: allergies, current medications, past family history, past medical history, past social history, past surgical history and problem list.    Review of Systems   HENT: Positive for ear pain.    Skin: Positive for color change.        Pruritus skin of external auditory canal bilaterally.       Objective     Vitals:    06/22/21 1614   BP: 130/90   Pulse: 74   Temp: 98 °F (36.7 °C)   SpO2: 98%       No results found for this or any previous visit (from the past 672 hour(s)).    Physical Exam  Vitals and nursing note reviewed.   Constitutional:       Appearance: Normal appearance. She is well-developed and well-groomed.   HENT:      Head: Normocephalic and atraumatic.      Right Ear: Hearing, tympanic membrane and external ear normal.      Left Ear: Hearing, tympanic membrane and external ear normal.      Nose: Nose normal.      Mouth/Throat:      Lips: Pink.      Mouth: Mucous membranes are moist.      Pharynx: Oropharynx is clear. Uvula midline.      Comments: Distal aspects of the auditory canals bilaterally with slight erythema and flaking consistent with an atopic dermatitis.  Neck:      Thyroid: No thyroid  mass or thyromegaly.      Vascular: Normal carotid pulses. No carotid bruit.      Trachea: Trachea and phonation normal.   Cardiovascular:      Rate and Rhythm: Normal rate and regular rhythm.      Heart sounds: Normal heart sounds. No murmur heard.   No friction rub. No gallop.    Pulmonary:      Effort: Pulmonary effort is normal. No respiratory distress.      Breath sounds: Normal breath sounds. No decreased breath sounds, wheezing, rhonchi or rales.   Musculoskeletal:      Cervical back: Neck supple.   Lymphadenopathy:      Cervical: No cervical adenopathy.   Skin:     General: Skin is warm and dry.      Findings: Erythema present. No rash.      Comments: Left facial cheek with barely discernible erythematous papule consistent more with an acne or follicular lesion.  No evidence of cancerous or precancerous lesion.   Neurological:      Mental Status: She is alert and oriented to person, place, and time.   Psychiatric:         Attention and Perception: Attention and perception normal.         Mood and Affect: Mood and affect normal.         Speech: Speech normal.         Behavior: Behavior normal. Behavior is cooperative.         Thought Content: Thought content normal.         Cognition and Memory: Cognition and memory normal.         Judgment: Judgment normal.         Assessment/Plan   Diagnoses and all orders for this visit:    1. Eczema of both external ears (Primary)  -     hydrocortisone 2.5 % cream; Apply  topically to the appropriate area as directed 2 (Two) Times a Day.  Dispense: 20 g; Refill: 1    2. Dysfunction of both eustachian tubes    3. Seasonal allergic rhinitis due to pollen    4. Acne vulgaris    Patient has been reassured in regards to her skin lesion on the left facial cheek and asked to leave this area alone-not picking or manipulating.  Anticipate this resolving completely within the next 2 to 3 weeks.  Have also asked patient to use Flonase nasal spray with very strict appropriate  technique on a twice daily basis until eustachian tubes are completely open and then continue this technique on a daily basis thereafter.  Steroid cream has been given to be applied to a Q-tip to just be applied to the skin surface in the distal external auditory canal twice daily.  If this fails to show improvement over the next 1 to 2 weeks she will contact this office.    Return if symptoms worsen or fail to improve.

## 2021-06-24 ENCOUNTER — TELEPHONE (OUTPATIENT)
Dept: FAMILY MEDICINE CLINIC | Facility: CLINIC | Age: 51
End: 2021-06-24
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
## 2021-06-24 NOTE — TELEPHONE ENCOUNTER
"Spoke with Dr Katz, he advises pt to try Ibuprofen if needed and may use ice.  Do not try to \"pop\" the blisters.  Otherwise there isnt any other treatments at this time.    Pt voiced understanding.  Will go to urgent care if need arises or call office.  "

## 2021-11-18 DIAGNOSIS — Z00.00 HEALTHCARE MAINTENANCE: ICD-10-CM

## 2021-11-18 DIAGNOSIS — E55.9 VITAMIN D DEFICIENCY: ICD-10-CM

## 2021-11-18 DIAGNOSIS — Z00.01 ENCOUNTER FOR WELL ADULT EXAM WITH ABNORMAL FINDINGS: Primary | ICD-10-CM

## 2021-11-25 LAB
25(OH)D3+25(OH)D2 SERPL-MCNC: 50.3 NG/ML (ref 30–100)
ALBUMIN SERPL-MCNC: 4.3 G/DL (ref 3.8–4.9)
ALBUMIN/GLOB SERPL: 1.6 {RATIO} (ref 1.2–2.2)
ALP SERPL-CCNC: 118 IU/L (ref 44–121)
ALT SERPL-CCNC: 50 IU/L (ref 0–32)
AST SERPL-CCNC: 25 IU/L (ref 0–40)
BASOPHILS # BLD AUTO: 0 X10E3/UL (ref 0–0.2)
BASOPHILS NFR BLD AUTO: 0 %
BILIRUB SERPL-MCNC: 0.4 MG/DL (ref 0–1.2)
BUN SERPL-MCNC: 15 MG/DL (ref 6–24)
BUN/CREAT SERPL: 17 (ref 9–23)
CALCIUM SERPL-MCNC: 9.7 MG/DL (ref 8.7–10.2)
CHLORIDE SERPL-SCNC: 103 MMOL/L (ref 96–106)
CHOLEST SERPL-MCNC: 225 MG/DL (ref 100–199)
CO2 SERPL-SCNC: 23 MMOL/L (ref 20–29)
CREAT SERPL-MCNC: 0.89 MG/DL (ref 0.57–1)
EOSINOPHIL # BLD AUTO: 0.2 X10E3/UL (ref 0–0.4)
EOSINOPHIL NFR BLD AUTO: 3 %
ERYTHROCYTE [DISTWIDTH] IN BLOOD BY AUTOMATED COUNT: 12.3 % (ref 11.7–15.4)
GLOBULIN SER CALC-MCNC: 2.7 G/DL (ref 1.5–4.5)
GLUCOSE SERPL-MCNC: 73 MG/DL (ref 65–99)
HCT VFR BLD AUTO: 44.9 % (ref 34–46.6)
HDLC SERPL-MCNC: 66 MG/DL
HGB BLD-MCNC: 14.9 G/DL (ref 11.1–15.9)
IMM GRANULOCYTES # BLD AUTO: 0 X10E3/UL (ref 0–0.1)
IMM GRANULOCYTES NFR BLD AUTO: 0 %
LDLC SERPL CALC-MCNC: 144 MG/DL (ref 0–99)
LYMPHOCYTES # BLD AUTO: 1.5 X10E3/UL (ref 0.7–3.1)
LYMPHOCYTES NFR BLD AUTO: 20 %
MCH RBC QN AUTO: 29.3 PG (ref 26.6–33)
MCHC RBC AUTO-ENTMCNC: 33.2 G/DL (ref 31.5–35.7)
MCV RBC AUTO: 88 FL (ref 79–97)
MONOCYTES # BLD AUTO: 0.5 X10E3/UL (ref 0.1–0.9)
MONOCYTES NFR BLD AUTO: 6 %
NEUTROPHILS # BLD AUTO: 5.4 X10E3/UL (ref 1.4–7)
NEUTROPHILS NFR BLD AUTO: 71 %
PLATELET # BLD AUTO: 297 X10E3/UL (ref 150–450)
POTASSIUM SERPL-SCNC: 4 MMOL/L (ref 3.5–5.2)
PROT SERPL-MCNC: 7 G/DL (ref 6–8.5)
RBC # BLD AUTO: 5.08 X10E6/UL (ref 3.77–5.28)
SODIUM SERPL-SCNC: 142 MMOL/L (ref 134–144)
TRIGL SERPL-MCNC: 85 MG/DL (ref 0–149)
TSH SERPL DL<=0.005 MIU/L-ACNC: 1.56 UIU/ML (ref 0.45–4.5)
VLDLC SERPL CALC-MCNC: 15 MG/DL (ref 5–40)
WBC # BLD AUTO: 7.5 X10E3/UL (ref 3.4–10.8)

## 2021-12-01 ENCOUNTER — OFFICE VISIT (OUTPATIENT)
Dept: FAMILY MEDICINE CLINIC | Facility: CLINIC | Age: 51
End: 2021-12-01

## 2021-12-01 ENCOUNTER — TELEPHONE (OUTPATIENT)
Dept: FAMILY MEDICINE CLINIC | Facility: CLINIC | Age: 51
End: 2021-12-01

## 2021-12-01 VITALS
HEIGHT: 61 IN | OXYGEN SATURATION: 99 % | BODY MASS INDEX: 36.25 KG/M2 | WEIGHT: 192 LBS | HEART RATE: 81 BPM | DIASTOLIC BLOOD PRESSURE: 82 MMHG | TEMPERATURE: 98 F | SYSTOLIC BLOOD PRESSURE: 122 MMHG

## 2021-12-01 DIAGNOSIS — E66.09 EXOGENOUS OBESITY: ICD-10-CM

## 2021-12-01 DIAGNOSIS — J30.1 SEASONAL ALLERGIC RHINITIS DUE TO POLLEN: ICD-10-CM

## 2021-12-01 DIAGNOSIS — E55.9 VITAMIN D DEFICIENCY: ICD-10-CM

## 2021-12-01 DIAGNOSIS — Z00.01 ENCOUNTER FOR WELL ADULT EXAM WITH ABNORMAL FINDINGS: Primary | ICD-10-CM

## 2021-12-01 DIAGNOSIS — E78.2 MIXED HYPERLIPIDEMIA: ICD-10-CM

## 2021-12-01 PROCEDURE — 99396 PREV VISIT EST AGE 40-64: CPT | Performed by: FAMILY MEDICINE

## 2021-12-01 RX ORDER — MULTIVIT WITH MINERALS/LUTEIN
250 TABLET ORAL DAILY
COMMUNITY

## 2021-12-01 NOTE — TELEPHONE ENCOUNTER
Patient forgot to ask Dr. Katz for an anxiety medicine when she saw him 12/01/21.  She wants something as needed.

## 2021-12-02 NOTE — PROGRESS NOTES
Subjective   Juan Kaur is a 51 y.o. female with   Chief Complaint   Patient presents with   • Annual Exam   .    History of Present Illness   51-year-old white female here for routine complete physical exam.  Patient has her own GYN and states that she is due for scheduling Pap smear as well as breast exam.  Mammogram is also out-of-date.  Patient's last menstrual period was actually 1 year ago.  She does have the occasional hot flashes and night sweats.  She does use estradiol vaginal cream for atrophic vaginitis.  She has a history of seasonal allergic rhinitis and during her season uses Zyrtec as well as Flonase nasal spray.  She does use a host of over-the-counter supplements and vitamins.  Fasting labs have been acquired prior to this visit.  She is not at this point had colonoscopy.  The following portions of the patient's history were reviewed and updated as appropriate: allergies, current medications, past family history, past medical history, past social history, past surgical history and problem list.    Review of Systems   Constitutional:        Obesity   Genitourinary:        Postmenopausal with atrophic vaginitis   Allergic/Immunologic: Positive for environmental allergies.   All other systems reviewed and are negative.      Objective     Vitals:    12/01/21 1527   BP: 122/82   Pulse: 81   Temp: 98 °F (36.7 °C)   SpO2: 99%       Recent Results (from the past 672 hour(s))   CBC & Differential    Collection Time: 11/24/21 10:20 AM    Specimen: Blood   Result Value Ref Range    WBC 7.5 3.4 - 10.8 x10E3/uL    RBC 5.08 3.77 - 5.28 x10E6/uL    Hemoglobin 14.9 11.1 - 15.9 g/dL    Hematocrit 44.9 34.0 - 46.6 %    MCV 88 79 - 97 fL    MCH 29.3 26.6 - 33.0 pg    MCHC 33.2 31.5 - 35.7 g/dL    RDW 12.3 11.7 - 15.4 %    Platelets 297 150 - 450 x10E3/uL    Neutrophil Rel % 71 Not Estab. %    Lymphocyte Rel % 20 Not Estab. %    Monocyte Rel % 6 Not Estab. %    Eosinophil Rel % 3 Not Estab. %    Basophil Rel % 0  Not Estab. %    Neutrophils Absolute 5.4 1.4 - 7.0 x10E3/uL    Lymphocytes Absolute 1.5 0.7 - 3.1 x10E3/uL    Monocytes Absolute 0.5 0.1 - 0.9 x10E3/uL    Eosinophils Absolute 0.2 0.0 - 0.4 x10E3/uL    Basophils Absolute 0.0 0.0 - 0.2 x10E3/uL    Immature Granulocyte Rel % 0 Not Estab. %    Immature Grans Absolute 0.0 0.0 - 0.1 x10E3/uL   Comprehensive Metabolic Panel    Collection Time: 11/24/21 10:20 AM    Specimen: Blood   Result Value Ref Range    Glucose 73 65 - 99 mg/dL    BUN 15 6 - 24 mg/dL    Creatinine 0.89 0.57 - 1.00 mg/dL    eGFR Non African Am 75 >59 mL/min/1.73    eGFR African Am 87 >59 mL/min/1.73    BUN/Creatinine Ratio 17 9 - 23    Sodium 142 134 - 144 mmol/L    Potassium 4.0 3.5 - 5.2 mmol/L    Chloride 103 96 - 106 mmol/L    Total CO2 23 20 - 29 mmol/L    Calcium 9.7 8.7 - 10.2 mg/dL    Total Protein 7.0 6.0 - 8.5 g/dL    Albumin 4.3 3.8 - 4.9 g/dL    Globulin 2.7 1.5 - 4.5 g/dL    A/G Ratio 1.6 1.2 - 2.2    Total Bilirubin 0.4 0.0 - 1.2 mg/dL    Alkaline Phosphatase 118 44 - 121 IU/L    AST (SGOT) 25 0 - 40 IU/L    ALT (SGPT) 50 (H) 0 - 32 IU/L   Lipid Panel    Collection Time: 11/24/21 10:20 AM    Specimen: Blood   Result Value Ref Range    Total Cholesterol 225 (H) 100 - 199 mg/dL    Triglycerides 85 0 - 149 mg/dL    HDL Cholesterol 66 >39 mg/dL    VLDL Cholesterol George 15 5 - 40 mg/dL    LDL Chol Calc (NIH) 144 (H) 0 - 99 mg/dL   TSH    Collection Time: 11/24/21 10:20 AM    Specimen: Blood   Result Value Ref Range    TSH 1.560 0.450 - 4.500 uIU/mL   Vitamin D 25 Hydroxy    Collection Time: 11/24/21 10:20 AM    Specimen: Blood   Result Value Ref Range    25 Hydroxy, Vitamin D 50.3 30.0 - 100.0 ng/mL       Physical Exam  Vitals and nursing note reviewed.   Constitutional:       General: She is not in acute distress.     Appearance: Normal appearance. She is well-developed and well-groomed. She is obese.      Comments: Exogenous obesity with a BMI of 36.3   HENT:      Head: Normocephalic and  atraumatic.      Right Ear: Hearing, tympanic membrane, ear canal and external ear normal.      Left Ear: Hearing, tympanic membrane, ear canal and external ear normal.      Nose: Nose normal.      Mouth/Throat:      Lips: Pink.      Mouth: Mucous membranes are moist.      Dentition: Normal dentition.      Tongue: No lesions. Tongue does not deviate from midline.      Palate: No mass and lesions.      Pharynx: Oropharynx is clear. Uvula midline.   Eyes:      General: Lids are normal. No scleral icterus.     Extraocular Movements: Extraocular movements intact.      Conjunctiva/sclera: Conjunctivae normal.      Pupils: Pupils are equal, round, and reactive to light.      Funduscopic exam:     Right eye: No hemorrhage, exudate, AV nicking or papilledema.         Left eye: No hemorrhage, exudate, AV nicking or papilledema.   Neck:      Thyroid: No thyroid mass or thyromegaly.      Vascular: No carotid bruit or JVD.      Trachea: Trachea normal.   Cardiovascular:      Rate and Rhythm: Normal rate and regular rhythm.      Chest Wall: PMI is not displaced.      Pulses: Normal pulses.           Carotid pulses are 2+ on the right side and 2+ on the left side.       Radial pulses are 2+ on the right side and 2+ on the left side.      Heart sounds: Normal heart sounds, S1 normal and S2 normal. No murmur heard.  No friction rub. No gallop.    Pulmonary:      Effort: Pulmonary effort is normal.      Breath sounds: Normal breath sounds. No decreased breath sounds, wheezing, rhonchi or rales.   Abdominal:      General: Abdomen is flat. Bowel sounds are normal. There is no distension.      Palpations: Abdomen is soft. Abdomen is not rigid. There is no hepatomegaly, splenomegaly or mass.      Tenderness: There is no abdominal tenderness. There is no right CVA tenderness, left CVA tenderness, guarding or rebound.      Hernia: No hernia is present.   Musculoskeletal:         General: No tenderness or deformity. Normal range of motion.       Cervical back: Normal range of motion and neck supple. No muscular tenderness. Normal range of motion.   Lymphadenopathy:      Cervical: No cervical adenopathy.   Skin:     General: Skin is warm and dry.      Findings: No rash.   Neurological:      Mental Status: She is alert and oriented to person, place, and time.      Cranial Nerves: Cranial nerves are intact. No cranial nerve deficit.      Sensory: Sensation is intact. No sensory deficit.      Motor: Motor function is intact. No tremor or abnormal muscle tone.      Coordination: Coordination is intact. Coordination normal.      Gait: Gait is intact. Gait normal.      Deep Tendon Reflexes: Reflexes are normal and symmetric. Reflexes normal.      Reflex Scores:       Bicep reflexes are 2+ on the right side and 2+ on the left side.       Brachioradialis reflexes are 2+ on the right side and 2+ on the left side.       Patellar reflexes are 2+ on the right side and 2+ on the left side.  Psychiatric:         Attention and Perception: Attention and perception normal.         Mood and Affect: Mood and affect normal.         Speech: Speech normal.         Behavior: Behavior normal. Behavior is cooperative.         Thought Content: Thought content normal.         Cognition and Memory: Cognition and memory normal.         Judgment: Judgment normal.         Assessment/Plan   Diagnoses and all orders for this visit:    1. Encounter for well adult exam with abnormal findings (Primary)  -     Lipid panel; Future  -     Comprehensive metabolic panel; Future  -     Vitamin D 25 hydroxy; Future  -     TSH; Future  -     CBC w AUTO Differential; Future    2. Mixed hyperlipidemia  -     Lipid panel; Future  -     Comprehensive metabolic panel; Future  -     Vitamin D 25 hydroxy; Future  -     TSH; Future  -     CBC w AUTO Differential; Future    3. Exogenous obesity  -     Lipid panel; Future  -     Comprehensive metabolic panel; Future  -     Vitamin D 25 hydroxy; Future  -      TSH; Future  -     CBC w AUTO Differential; Future    4. Vitamin D deficiency  -     Lipid panel; Future  -     Comprehensive metabolic panel; Future  -     Vitamin D 25 hydroxy; Future  -     TSH; Future  -     CBC w AUTO Differential; Future    5. Seasonal allergic rhinitis due to pollen  -     Lipid panel; Future  -     Comprehensive metabolic panel; Future  -     Vitamin D 25 hydroxy; Future  -     TSH; Future  -     CBC w AUTO Differential; Future    Weight loss would be beneficial.  Patient has been instructed to lower cholesterol in her diet with anticipation of recheck of fasting lab work in 6 months and follow-up with me thereafter.  Patient has been advised to wear seatbelts while driving and a bicycle helmet while riding a bicycle.    Return in about 6 months (around 6/1/2022) for Recheck.

## 2021-12-04 DIAGNOSIS — F41.1 GENERALIZED ANXIETY DISORDER: Primary | ICD-10-CM

## 2021-12-04 RX ORDER — HYDROXYZINE HYDROCHLORIDE 25 MG/1
25 TABLET, FILM COATED ORAL EVERY 8 HOURS PRN
Qty: 60 TABLET | Refills: 0 | Status: SHIPPED | OUTPATIENT
Start: 2021-12-04

## 2022-03-28 ENCOUNTER — OFFICE VISIT (OUTPATIENT)
Dept: FAMILY MEDICINE CLINIC | Facility: CLINIC | Age: 52
End: 2022-03-28

## 2022-03-28 VITALS — OXYGEN SATURATION: 98 % | HEART RATE: 82 BPM | TEMPERATURE: 98 F

## 2022-03-28 DIAGNOSIS — J01.00 ACUTE NON-RECURRENT MAXILLARY SINUSITIS: Primary | ICD-10-CM

## 2022-03-28 PROCEDURE — 99213 OFFICE O/P EST LOW 20 MIN: CPT | Performed by: FAMILY MEDICINE

## 2022-03-28 RX ORDER — AMOXICILLIN AND CLAVULANATE POTASSIUM 875; 125 MG/1; MG/1
1 TABLET, FILM COATED ORAL 2 TIMES DAILY
Qty: 20 TABLET | Refills: 0 | Status: SHIPPED | OUTPATIENT
Start: 2022-03-28 | End: 2022-06-16

## 2022-03-29 NOTE — PROGRESS NOTES
Subjective   Juan Kaur is a 52 y.o. female with   Chief Complaint   Patient presents with   • Sinus Problem     Pressure, drainage primarily on left side of head   • Headache   .    History of Present Illness   52-year-old white female with 1 week history of increased nasal congestion with marked postnasal drip and minimal cough.  She is expelling yellow-greenish rhinorrhea.  There has been no fever and patient denies loss of taste or smell.  She does admit to increased sinus pressure as well as teeth pain and frontal headache.  Headache is worse if she bends over and at times feels that her head wants to explode.  She had COVID upper respiratory infection in January and has fully recovered prior to the onset of the above.  The following portions of the patient's history were reviewed and updated as appropriate: allergies, current medications, past family history, past medical history, past social history, past surgical history and problem list.    Review of Systems   Constitutional: Negative for fatigue and fever.   HENT: Positive for congestion, postnasal drip, sinus pressure and sore throat.    Respiratory: Positive for cough. Negative for shortness of breath and wheezing.    Cardiovascular: Negative for chest pain.   Neurological: Positive for headaches.       Objective     Vitals:    03/28/22 1631   Pulse: 82   Temp: 98 °F (36.7 °C)   SpO2: 98%       No results found for this or any previous visit (from the past 672 hour(s)).    Physical Exam    Assessment/Plan   Diagnoses and all orders for this visit:    1. Acute non-recurrent maxillary sinusitis (Primary)  -     amoxicillin-clavulanate (Augmentin) 875-125 MG per tablet; Take 1 tablet by mouth 2 (Two) Times a Day.  Dispense: 20 tablet; Refill: 0        Return if symptoms worsen or fail to improve.

## 2022-04-04 ENCOUNTER — TELEPHONE (OUTPATIENT)
Dept: FAMILY MEDICINE CLINIC | Facility: CLINIC | Age: 52
End: 2022-04-04

## 2022-04-04 RX ORDER — FLUCONAZOLE 150 MG/1
150 TABLET ORAL ONCE
Qty: 1 TABLET | Refills: 1 | Status: SHIPPED | OUTPATIENT
Start: 2022-04-04 | End: 2022-04-04

## 2022-04-04 NOTE — TELEPHONE ENCOUNTER
PATIENT CALLED STATING HAS STARTED EXPERIENCING YEAST INFECTION AFTER FINISHING ROUND OF ANTIBIOTICS. IF DR COULD SEND A PRESCRIPTION OF DIFLUCAN.  ALSO HAS NOT BEEN ABLE TO GET RID OF PRODUCTIVE COUGH, IF THERE'S ANY MEDICATION LIKE SUDAFED THAT CAN TAKE TO GET RID OF COUGH    [id number] - [place] - [address] [address] AT [facility] [address] - [phone]  - [phone] FX

## 2022-06-03 DIAGNOSIS — E55.9 VITAMIN D DEFICIENCY: ICD-10-CM

## 2022-06-03 DIAGNOSIS — E66.09 EXOGENOUS OBESITY: ICD-10-CM

## 2022-06-03 DIAGNOSIS — Z00.01 ENCOUNTER FOR WELL ADULT EXAM WITH ABNORMAL FINDINGS: ICD-10-CM

## 2022-06-03 DIAGNOSIS — J30.1 SEASONAL ALLERGIC RHINITIS DUE TO POLLEN: ICD-10-CM

## 2022-06-03 DIAGNOSIS — E78.2 MIXED HYPERLIPIDEMIA: ICD-10-CM

## 2022-06-09 LAB
25(OH)D3+25(OH)D2 SERPL-MCNC: 58.5 NG/ML (ref 30–100)
ALBUMIN SERPL-MCNC: 3.9 G/DL (ref 3.8–4.9)
ALBUMIN/GLOB SERPL: 1.4 {RATIO} (ref 1.2–2.2)
ALP SERPL-CCNC: 100 IU/L (ref 44–121)
ALT SERPL-CCNC: 19 IU/L (ref 0–32)
AST SERPL-CCNC: 15 IU/L (ref 0–40)
BASOPHILS # BLD AUTO: 0 X10E3/UL (ref 0–0.2)
BASOPHILS NFR BLD AUTO: 0 %
BILIRUB SERPL-MCNC: 0.3 MG/DL (ref 0–1.2)
BUN SERPL-MCNC: 15 MG/DL (ref 6–24)
BUN/CREAT SERPL: 17 (ref 9–23)
CALCIUM SERPL-MCNC: 9.4 MG/DL (ref 8.7–10.2)
CHLORIDE SERPL-SCNC: 105 MMOL/L (ref 96–106)
CHOLEST SERPL-MCNC: 180 MG/DL (ref 100–199)
CO2 SERPL-SCNC: 23 MMOL/L (ref 20–29)
CREAT SERPL-MCNC: 0.89 MG/DL (ref 0.57–1)
EGFRCR SERPLBLD CKD-EPI 2021: 78 ML/MIN/1.73
EOSINOPHIL # BLD AUTO: 0.3 X10E3/UL (ref 0–0.4)
EOSINOPHIL NFR BLD AUTO: 5 %
ERYTHROCYTE [DISTWIDTH] IN BLOOD BY AUTOMATED COUNT: 12.5 % (ref 11.7–15.4)
GLOBULIN SER CALC-MCNC: 2.7 G/DL (ref 1.5–4.5)
GLUCOSE SERPL-MCNC: 87 MG/DL (ref 65–99)
HCT VFR BLD AUTO: 41.3 % (ref 34–46.6)
HDLC SERPL-MCNC: 49 MG/DL
HGB BLD-MCNC: 13.8 G/DL (ref 11.1–15.9)
IMM GRANULOCYTES # BLD AUTO: 0 X10E3/UL (ref 0–0.1)
IMM GRANULOCYTES NFR BLD AUTO: 0 %
LDLC SERPL CALC-MCNC: 113 MG/DL (ref 0–99)
LYMPHOCYTES # BLD AUTO: 1.8 X10E3/UL (ref 0.7–3.1)
LYMPHOCYTES NFR BLD AUTO: 29 %
MCH RBC QN AUTO: 29.1 PG (ref 26.6–33)
MCHC RBC AUTO-ENTMCNC: 33.4 G/DL (ref 31.5–35.7)
MCV RBC AUTO: 87 FL (ref 79–97)
MONOCYTES # BLD AUTO: 0.5 X10E3/UL (ref 0.1–0.9)
MONOCYTES NFR BLD AUTO: 9 %
NEUTROPHILS # BLD AUTO: 3.6 X10E3/UL (ref 1.4–7)
NEUTROPHILS NFR BLD AUTO: 57 %
PLATELET # BLD AUTO: 286 X10E3/UL (ref 150–450)
POTASSIUM SERPL-SCNC: 4.3 MMOL/L (ref 3.5–5.2)
PROT SERPL-MCNC: 6.6 G/DL (ref 6–8.5)
RBC # BLD AUTO: 4.74 X10E6/UL (ref 3.77–5.28)
SODIUM SERPL-SCNC: 141 MMOL/L (ref 134–144)
TRIGL SERPL-MCNC: 96 MG/DL (ref 0–149)
TSH SERPL DL<=0.005 MIU/L-ACNC: 2.63 UIU/ML (ref 0.45–4.5)
VLDLC SERPL CALC-MCNC: 18 MG/DL (ref 5–40)
WBC # BLD AUTO: 6.3 X10E3/UL (ref 3.4–10.8)

## 2022-06-15 ENCOUNTER — OFFICE VISIT (OUTPATIENT)
Dept: FAMILY MEDICINE CLINIC | Facility: CLINIC | Age: 52
End: 2022-06-15

## 2022-06-15 VITALS
BODY MASS INDEX: 35.3 KG/M2 | TEMPERATURE: 97.1 F | SYSTOLIC BLOOD PRESSURE: 130 MMHG | HEIGHT: 61 IN | WEIGHT: 187 LBS | OXYGEN SATURATION: 98 % | DIASTOLIC BLOOD PRESSURE: 82 MMHG | HEART RATE: 76 BPM

## 2022-06-15 DIAGNOSIS — J30.1 SEASONAL ALLERGIC RHINITIS DUE TO POLLEN: ICD-10-CM

## 2022-06-15 DIAGNOSIS — Z78.0 POSTMENOPAUSAL: ICD-10-CM

## 2022-06-15 DIAGNOSIS — E55.9 VITAMIN D DEFICIENCY: ICD-10-CM

## 2022-06-15 DIAGNOSIS — F41.1 GENERALIZED ANXIETY DISORDER: ICD-10-CM

## 2022-06-15 DIAGNOSIS — Z00.01 ENCOUNTER FOR WELL ADULT EXAM WITH ABNORMAL FINDINGS: Primary | ICD-10-CM

## 2022-06-15 DIAGNOSIS — Z12.4 PAPANICOLAOU SMEAR FOR CERVICAL CANCER SCREENING: ICD-10-CM

## 2022-06-15 DIAGNOSIS — E66.09 EXOGENOUS OBESITY: ICD-10-CM

## 2022-06-15 DIAGNOSIS — E78.2 MIXED HYPERLIPIDEMIA: ICD-10-CM

## 2022-06-15 DIAGNOSIS — Z12.31 ENCOUNTER FOR SCREENING MAMMOGRAM FOR MALIGNANT NEOPLASM OF BREAST: ICD-10-CM

## 2022-06-15 LAB
BILIRUB BLD-MCNC: NEGATIVE MG/DL
CLARITY, POC: CLEAR
COLOR UR: YELLOW
GLUCOSE UR STRIP-MCNC: NEGATIVE MG/DL
KETONES UR QL: NEGATIVE
LEUKOCYTE EST, POC: NEGATIVE
NITRITE UR-MCNC: NEGATIVE MG/ML
PH UR: 6 [PH] (ref 5–8)
PROT UR STRIP-MCNC: NEGATIVE MG/DL
RBC # UR STRIP: NEGATIVE /UL
SP GR UR: 1.02 (ref 1–1.03)
UROBILINOGEN UR QL: NORMAL

## 2022-06-15 PROCEDURE — 99396 PREV VISIT EST AGE 40-64: CPT | Performed by: FAMILY MEDICINE

## 2022-06-15 PROCEDURE — 81002 URINALYSIS NONAUTO W/O SCOPE: CPT | Performed by: FAMILY MEDICINE

## 2022-06-15 NOTE — PROGRESS NOTES
"Subjective   Juan Kaur is a 52 y.o. woman who comes in today for a  pap smear only. Patient is a  4 para 4 Ab 1 with last menstrual period of approximately 3 years ago.  Her most recent annual exam was more than 2022 ago and showed no abnormalities. Previous abnormal Pap smears: no. Contraception: post menopausal status. Last mammogram several years ago. Last Dexa scan never.  Past medical history includes postmenopausal, seasonal allergic rhinitis as well as exogenous obesity.  Current medications include over-the-counter antihistamine such as Zyrtec and Flonase nasal spray.  Patient also uses estradiol vaginal cream for atrophic vaginitis.  She does use a host of supplements and vitamins including vitamin D3.  All medications are used appropriately and are well-tolerated without side effects.  Patient is currently doing well and has no acute complaints.    The following portions of the patient's history were reviewed and updated as appropriate: allergies, current medications, past family history, past medical history, past social history, past surgical history and problem list.    Review of Systems  Pertinent items are noted in HPI.    neck is supple without adenopathy or thyromegaly.  Carotid upstrokes +2 and symmetrical with no evidence of bruit bilaterally.  Heart regular rhythm without murmur gallop or rub.  Lungs clear to auscultation with good bilateral breath sounds.  Abdomen is soft and flat with positive normoactive bowel sounds.  There is no evidence of mass organomegaly.  No evidence of tenderness with deep palpation.  No evidence of guarding or rebound.  Distal pulses are +2 and symmetrical.  Breasts are symmetrical with nipples that are inverted/everted and with/without discharge.  Breasts are nontender and without skin changes.  There is no mass palpable.  Exogenous obesity with a BMI of 35.4    Objective   /82   Pulse 76   Temp 97.1 °F (36.2 °C)   Ht 154.9 cm (61\")   Wt 84.8 " kg (187 lb)   SpO2 98%   BMI 35.33 kg/m²   Pelvic Exam: cervix normal in appearance, external genitalia normal, no adnexal masses or tenderness, no bladder tenderness, no cervical motion tenderness, perianal skin: no external genital warts noted, rectovaginal septum normal, urethra without abnormality or discharge, uterus normal size, shape, and consistency and vagina normal without discharge. Pap smear obtained.  Office Visit on 06/15/2022   Component Date Value Ref Range Status   • Color 06/15/2022 Yellow  Yellow, Straw, Dark Yellow, Zaida Final   • Clarity, UA 06/15/2022 Clear  Clear Final   • Glucose, UA 06/15/2022 Negative  Negative, 1000 mg/dL (3+) mg/dL Final   • Bilirubin 06/15/2022 Negative  Negative Final   • Ketones, UA 06/15/2022 Negative  Negative Final   • Specific Gravity  06/15/2022 1.020  1.005 - 1.030 Final   • Blood, UA 06/15/2022 Negative  Negative Final   • pH, Urine 06/15/2022 6.0  5.0 - 8.0 Final   • Protein, POC 06/15/2022 Negative  Negative mg/dL Final   • Urobilinogen, UA 06/15/2022 Normal  Normal Final   • Leukocytes 06/15/2022 Negative  Negative Final   • Nitrite, UA 06/15/2022 Negative  Negative Final   Orders Only on 06/03/2022   Component Date Value Ref Range Status   • WBC 06/08/2022 6.3  3.4 - 10.8 x10E3/uL Final   • RBC 06/08/2022 4.74  3.77 - 5.28 x10E6/uL Final   • Hemoglobin 06/08/2022 13.8  11.1 - 15.9 g/dL Final   • Hematocrit 06/08/2022 41.3  34.0 - 46.6 % Final   • MCV 06/08/2022 87  79 - 97 fL Final   • MCH 06/08/2022 29.1  26.6 - 33.0 pg Final   • MCHC 06/08/2022 33.4  31.5 - 35.7 g/dL Final   • RDW 06/08/2022 12.5  11.7 - 15.4 % Final   • Platelets 06/08/2022 286  150 - 450 x10E3/uL Final   • Neutrophil Rel % 06/08/2022 57  Not Estab. % Final   • Lymphocyte Rel % 06/08/2022 29  Not Estab. % Final   • Monocyte Rel % 06/08/2022 9  Not Estab. % Final   • Eosinophil Rel % 06/08/2022 5  Not Estab. % Final   • Basophil Rel % 06/08/2022 0  Not Estab. % Final   • Neutrophils  Absolute 06/08/2022 3.6  1.4 - 7.0 x10E3/uL Final   • Lymphocytes Absolute 06/08/2022 1.8  0.7 - 3.1 x10E3/uL Final   • Monocytes Absolute 06/08/2022 0.5  0.1 - 0.9 x10E3/uL Final   • Eosinophils Absolute 06/08/2022 0.3  0.0 - 0.4 x10E3/uL Final   • Basophils Absolute 06/08/2022 0.0  0.0 - 0.2 x10E3/uL Final   • Immature Granulocyte Rel % 06/08/2022 0  Not Estab. % Final   • Immature Grans Absolute 06/08/2022 0.0  0.0 - 0.1 x10E3/uL Final   • TSH 06/08/2022 2.630  0.450 - 4.500 uIU/mL Final   • 25 Hydroxy, Vitamin D 06/08/2022 58.5  30.0 - 100.0 ng/mL Final    Comment: Vitamin D deficiency has been defined by the [place] of  Medicine and an Endocrine Society practice guideline as a  level of serum 25-OH vitamin D less than 20 ng/mL (1,2).  The Endocrine Society went on to further define vitamin D  insufficiency as a level between 21 and 29 ng/mL (2).  1. IOM ([place] of [employer]). 2010. Dietary reference     intakes for calcium and D. [place]: The     National Academies Press.  2. Rosibel MF, Malathi VARGHESE, Farzad RAYMOND, et al.     Evaluation, treatment, and prevention of vitamin D     deficiency: an Endocrine Society clinical practice     guideline. JCEM. 2011 Jul; 96(7):1911-30.     • Glucose 06/08/2022 87  65 - 99 mg/dL Final   • BUN 06/08/2022 15  6 - 24 mg/dL Final   • Creatinine 06/08/2022 0.89  0.57 - 1.00 mg/dL Final   • EGFR Result 06/08/2022 78  >59 mL/min/1.73 Final   • BUN/Creatinine Ratio 06/08/2022 17  9 - 23 Final   • Sodium 06/08/2022 141  134 - 144 mmol/L Final   • Potassium 06/08/2022 4.3  3.5 - 5.2 mmol/L Final   • Chloride 06/08/2022 105  96 - 106 mmol/L Final   • Total CO2 06/08/2022 23  20 - 29 mmol/L Final   • Calcium 06/08/2022 9.4  8.7 - 10.2 mg/dL Final   • Total Protein 06/08/2022 6.6  6.0 - 8.5 g/dL Final   • Albumin 06/08/2022 3.9  3.8 - 4.9 g/dL Final   • Globulin 06/08/2022 2.7  1.5 - 4.5 g/dL Final   • A/G Ratio 06/08/2022 1.4  1.2 - 2.2 Final   • Total Bilirubin  06/08/2022 0.3  0.0 - 1.2 mg/dL Final   • Alkaline Phosphatase 06/08/2022 100  44 - 121 IU/L Final   • AST (SGOT) 06/08/2022 15  0 - 40 IU/L Final   • ALT (SGPT) 06/08/2022 19  0 - 32 IU/L Final   • Total Cholesterol 06/08/2022 180  100 - 199 mg/dL Final   • Triglycerides 06/08/2022 96  0 - 149 mg/dL Final   • HDL Cholesterol 06/08/2022 49  >39 mg/dL Final   • VLDL Cholesterol George 06/08/2022 18  5 - 40 mg/dL Final   • LDL Chol Calc (NIH) 06/08/2022 113 (A) 0 - 99 mg/dL Final       Assessment & Plan   Screening pap smear.    Follow up in 1 year, or as indicated by Pap results.    Diagnoses and all orders for this visit:    1. Encounter for well adult exam with abnormal findings (Primary)  -     POC Urinalysis Dipstick  -     Comprehensive metabolic panel; Future  -     TSH; Future  -     Vitamin D 25 hydroxy; Future  -     Lipid panel; Future  -     CBC w AUTO Differential; Future    2. Papanicolaou smear for cervical cancer screening  -     IGP, Rfx Aptima HPV ASCU  -     Comprehensive metabolic panel; Future  -     TSH; Future  -     Vitamin D 25 hydroxy; Future  -     Lipid panel; Future  -     CBC w AUTO Differential; Future    3. Postmenopausal  -     DEXA Bone Density Axial; Future  -     Comprehensive metabolic panel; Future  -     TSH; Future  -     Vitamin D 25 hydroxy; Future  -     Lipid panel; Future  -     CBC w AUTO Differential; Future    4. Encounter for screening mammogram for malignant neoplasm of breast  -     Mammo Screening Bilateral With CAD; Future  -     Comprehensive metabolic panel; Future  -     TSH; Future  -     Vitamin D 25 hydroxy; Future  -     Lipid panel; Future  -     CBC w AUTO Differential; Future    5. Seasonal allergic rhinitis due to pollen  -     Comprehensive metabolic panel; Future  -     TSH; Future  -     Vitamin D 25 hydroxy; Future  -     Lipid panel; Future  -     CBC w AUTO Differential; Future    6. Mixed hyperlipidemia  -     Comprehensive metabolic panel; Future  -      TSH; Future  -     Vitamin D 25 hydroxy; Future  -     Lipid panel; Future  -     CBC w AUTO Differential; Future    7. Exogenous obesity  -     Comprehensive metabolic panel; Future  -     TSH; Future  -     Vitamin D 25 hydroxy; Future  -     Lipid panel; Future  -     CBC w AUTO Differential; Future    8. Vitamin D deficiency  -     Comprehensive metabolic panel; Future  -     TSH; Future  -     Vitamin D 25 hydroxy; Future  -     Lipid panel; Future  -     CBC w AUTO Differential; Future    9. Generalized anxiety disorder  -     Comprehensive metabolic panel; Future  -     TSH; Future  -     Vitamin D 25 hydroxy; Future  -     Lipid panel; Future  -     CBC w AUTO Differential; Future    Other orders  -     Cancel: POC Urinalysis Dipstick    Breast self-exam was instructed and encouraged to be performed on a monthly basis.  Weight loss would be beneficial.  Anticipate follow-up in 6 months preceded by fasting labs.      Current Outpatient Medications:   •  cetirizine (ZyrTEC) 10 MG tablet, Take by mouth., Disp: , Rfl:   •  Cholecalciferol (VITAMIN D3) 5000 units capsule capsule, Take 5,000 Units by mouth Daily., Disp: , Rfl:   •  ELDERBERRY PO, Take  by mouth., Disp: , Rfl:   •  estradiol (ESTRACE) 0.1 MG/GM vaginal cream, , Disp: , Rfl:   •  fluticasone (FLONASE) 50 MCG/ACT nasal spray, 2 sprays into the nostril(s) as directed by provider Daily., Disp: , Rfl:   •  hydrOXYzine (ATARAX) 25 MG tablet, Take 1 tablet by mouth Every 8 (Eight) Hours As Needed for Anxiety., Disp: 60 tablet, Rfl: 0  •  Probiotic Product (PROBIOTIC-10 PO), Take  by mouth., Disp: , Rfl:   •  vitamin C (ASCORBIC ACID) 250 MG tablet, Take 250 mg by mouth Daily., Disp: , Rfl:   •  Zinc Sulfate (ZINC 15 PO), Take  by mouth., Disp: , Rfl:

## 2022-06-16 PROBLEM — Z78.0 POSTMENOPAUSAL: Status: ACTIVE | Noted: 2022-06-16

## 2022-06-18 LAB
CONV .: NORMAL
CYTOLOGIST CVX/VAG CYTO: NORMAL
CYTOLOGY CVX/VAG DOC CYTO: NORMAL
CYTOLOGY CVX/VAG DOC THIN PREP: NORMAL
DX ICD CODE: NORMAL
HIV 1 & 2 AB SER-IMP: NORMAL
OTHER STN SPEC: NORMAL
STAT OF ADQ CVX/VAG CYTO-IMP: NORMAL

## 2022-06-24 ENCOUNTER — HOSPITAL ENCOUNTER (OUTPATIENT)
Dept: MAMMOGRAPHY | Facility: HOSPITAL | Age: 52
Discharge: HOME | End: 2022-06-24

## 2022-06-24 ENCOUNTER — HOSPITAL ENCOUNTER (OUTPATIENT)
Dept: BONE DENSITY | Facility: HOSPITAL | Age: 52
Discharge: HOME | End: 2022-06-24

## 2022-06-24 ENCOUNTER — TELEPHONE (OUTPATIENT)
Dept: FAMILY MEDICINE CLINIC | Facility: CLINIC | Age: 52
End: 2022-06-24

## 2022-06-24 DIAGNOSIS — Z12.31 ENCOUNTER FOR SCREENING MAMMOGRAM FOR MALIGNANT NEOPLASM OF BREAST: ICD-10-CM

## 2022-06-24 DIAGNOSIS — Z78.0 POSTMENOPAUSAL: ICD-10-CM

## 2022-06-24 PROCEDURE — 77063 BREAST TOMOSYNTHESIS BI: CPT

## 2022-06-24 PROCEDURE — 77067 SCR MAMMO BI INCL CAD: CPT

## 2022-06-24 PROCEDURE — 77080 DXA BONE DENSITY AXIAL: CPT

## 2022-06-24 RX ORDER — MONTELUKAST SODIUM 10 MG/1
10 TABLET ORAL NIGHTLY
Qty: 30 TABLET | Refills: 0 | Status: SHIPPED | OUTPATIENT
Start: 2022-06-24 | End: 2022-07-20

## 2022-06-24 NOTE — TELEPHONE ENCOUNTER
Caller: Juan Kaur    Relationship: Self    Best call back number: [phone]     What medication are you requesting: SINGULAIR    What are your current symptoms: ALLERGIES    How long have you been experiencing symptoms: ONGOING    Have you had these symptoms before:    [x] Yes  [] No    Have you been treated for these symptoms before:   [x] Yes  [] No    If a prescription is needed, what is your preferred pharmacy and phone number: [facility][id number] - [facility] - [address] [address] AT [address] [address] - [phone]  - [phone]      Additional notes: PATIENT WAS IN TO SEE DR. WATKINS ON 6/15/22 AND SHE MENTIONED HER ALLERGIES WERE ACTING UP. HE STATED HE WOULD CALL IN SINGULAIR TO SEE IF IT HELPS PATIENT BUT NOTHING WAS CALLED IN.    PLEASE ADVISE

## 2022-07-20 RX ORDER — MONTELUKAST SODIUM 10 MG/1
TABLET ORAL
Qty: 30 TABLET | Refills: 0 | Status: SHIPPED | OUTPATIENT
Start: 2022-07-20 | End: 2022-08-15

## 2022-08-15 RX ORDER — MONTELUKAST SODIUM 10 MG/1
TABLET ORAL
Qty: 30 TABLET | Refills: 0 | Status: SHIPPED | OUTPATIENT
Start: 2022-08-15 | End: 2022-09-13

## 2022-09-13 RX ORDER — MONTELUKAST SODIUM 10 MG/1
TABLET ORAL
Qty: 30 TABLET | Refills: 0 | Status: SHIPPED | OUTPATIENT
Start: 2022-09-13 | End: 2022-10-11

## 2022-10-11 RX ORDER — MONTELUKAST SODIUM 10 MG/1
TABLET ORAL
Qty: 30 TABLET | Refills: 1 | Status: SHIPPED | OUTPATIENT
Start: 2022-10-11

## 2022-10-31 ENCOUNTER — FLU SHOT (OUTPATIENT)
Dept: FAMILY MEDICINE CLINIC | Facility: CLINIC | Age: 52
End: 2022-10-31

## 2022-10-31 DIAGNOSIS — Z23 NEED FOR INFLUENZA VACCINATION: Primary | ICD-10-CM

## 2022-10-31 PROCEDURE — 90471 IMMUNIZATION ADMIN: CPT | Performed by: FAMILY MEDICINE

## 2022-10-31 PROCEDURE — 90686 IIV4 VACC NO PRSV 0.5 ML IM: CPT | Performed by: FAMILY MEDICINE

## 2022-12-19 ENCOUNTER — TELEPHONE (OUTPATIENT)
Dept: FAMILY MEDICINE CLINIC | Facility: CLINIC | Age: 52
End: 2022-12-19

## 2022-12-19 NOTE — TELEPHONE ENCOUNTER
Caller: Juan Kaur    Relationship: Self    Best call back number: [phone]    What is the best time to reach you: ANY    Who are you requesting to speak with (clinical staff, provider,  specific staff member): CLINICAL    Do you know the name of the person who called: PATIENT    What was the call regarding: PATIENT IS NEEDING TO FIND HER SHOT RECORD.  PATIENT IS NEEDING TO KNOW IF SHE HAS HAD A HEP B.    PATIENT IS INQUIRING IF AN ANTIBODY TEST WILL SHOW IF SHE HAS HAD THE HEP B.     Do you require a callback: YES

## 2023-02-27 NOTE — PATIENT INSTRUCTIONS
Have advised patient to acquire vitamin D3 at 5000 international units per day.  She has also been advised to increase weight loss.  This will require a more disciplined diet and exercise program.  In so doing patient will also lower cholesterol in her diet.  Anticipate fasting labs in approximate 6 months.   [Negative] : Heme/Lymph

## 2023-03-09 ENCOUNTER — TELEPHONE (OUTPATIENT)
Dept: FAMILY MEDICINE CLINIC | Facility: CLINIC | Age: 53
End: 2023-03-09

## 2023-03-09 NOTE — TELEPHONE ENCOUNTER
Caller: Juan Kaur    Relationship: Self    Best call back number: [phone]    What was the call regarding: PATIENT STATED SHE NOTICED WHEN SHE WIPED AFTER USING THE RESTROOM SHE HAD STARTED BLEEDING LIKE SHE WAS STARTING HER PERIOD.     PATIENT STATED ITS BEEN A YEAR OR TWO SINCE SHE HAD A PERIOD AND WOULD LIKE TO KNOW IF THIS IS NORMAL OR WHAT SHE NEEDS TO DO.     PLEASE CALL TO DISCUSS AND ADVISE.

## 2023-03-14 ENCOUNTER — OFFICE VISIT (OUTPATIENT)
Dept: FAMILY MEDICINE CLINIC | Facility: CLINIC | Age: 53
End: 2023-03-14
Payer: COMMERCIAL

## 2023-03-14 VITALS
WEIGHT: 190 LBS | SYSTOLIC BLOOD PRESSURE: 132 MMHG | TEMPERATURE: 97.5 F | BODY MASS INDEX: 35.87 KG/M2 | OXYGEN SATURATION: 100 % | HEIGHT: 61 IN | HEART RATE: 86 BPM | DIASTOLIC BLOOD PRESSURE: 82 MMHG

## 2023-03-14 DIAGNOSIS — N95.0 POSTMENOPAUSAL VAGINAL BLEEDING: ICD-10-CM

## 2023-03-14 DIAGNOSIS — Z78.0 POSTMENOPAUSAL: Primary | ICD-10-CM

## 2023-03-14 PROCEDURE — 99213 OFFICE O/P EST LOW 20 MIN: CPT | Performed by: FAMILY MEDICINE

## 2023-03-15 NOTE — PROGRESS NOTES
Subjective   Juan Kaur is a 53 y.o. female with   Chief Complaint   Patient presents with   • Vaginal Bleeding   .    History of Present Illness   53-year-old white female here with complaint of recent vaginal bleeding.  Patient's last menstrual period was approximately 14 to 15 months ago.  She has had no abdominal cramping and has seen bright red blood upon wiping on 2 separate occasions.  She has therefore worn a pad and found no blood on her pad.  In general she feels well without any adverse effect.  She is not interested in estrogen replacement therapy at this time.  Last Pap smear was performed in June of last year and she is scheduled to return in approximately 3 months.  She has not been using the Estrace vaginal cream.  There has been no postcoital bleeding.  The following portions of the patient's history were reviewed and updated as appropriate: allergies, current medications, past family history, past medical history, past social history, past surgical history and problem list.    Review of Systems   Genitourinary: Positive for menstrual problem.       Objective     Vitals:    03/14/23 1609   BP: 132/82   Pulse: 86   Temp: 97.5 °F (36.4 °C)   SpO2: 100%       No results found for this or any previous visit (from the past 672 hour(s)).    Physical Exam  Vitals and nursing note reviewed.   Constitutional:       Appearance: Normal appearance. She is well-developed and well-groomed.   HENT:      Head: Normocephalic and atraumatic.   Musculoskeletal:      Cervical back: Neck supple.   Skin:     General: Skin is warm and dry.      Findings: No rash.   Neurological:      Mental Status: She is alert and oriented to person, place, and time.   Psychiatric:         Attention and Perception: Attention and perception normal.         Mood and Affect: Mood and affect normal.         Speech: Speech normal.         Behavior: Behavior normal. Behavior is cooperative.         Thought Content: Thought content normal.          Cognition and Memory: Cognition and memory normal.         Judgment: Judgment normal.         Assessment & Plan   Diagnoses and all orders for this visit:    1. Postmenopausal (Primary)    2. Postmenopausal vaginal bleeding    Reassured patient that this is likely a benign episode and will await for her Pap smear in 3 months for further evaluation.  She will contact this office with any adverse change in her condition.    Return in about 3 months (around 6/14/2023) for Recheck.

## 2023-08-16 ENCOUNTER — TRANSCRIBE ORDERS (OUTPATIENT)
Dept: ADMINISTRATIVE | Facility: HOSPITAL | Age: 53
End: 2023-08-16
Payer: COMMERCIAL

## 2023-08-16 DIAGNOSIS — Z12.31 SCREENING MAMMOGRAM FOR BREAST CANCER: Primary | ICD-10-CM

## 2023-08-25 ENCOUNTER — HOSPITAL ENCOUNTER (OUTPATIENT)
Dept: MAMMOGRAPHY | Facility: HOSPITAL | Age: 53
Discharge: HOME | End: 2023-08-25
Admitting: FAMILY MEDICINE
Payer: COMMERCIAL

## 2023-08-25 DIAGNOSIS — Z12.31 SCREENING MAMMOGRAM FOR BREAST CANCER: ICD-10-CM

## 2023-08-25 PROCEDURE — 77067 SCR MAMMO BI INCL CAD: CPT

## 2023-08-25 PROCEDURE — 77063 BREAST TOMOSYNTHESIS BI: CPT

## 2024-02-14 ENCOUNTER — TELEPHONE (OUTPATIENT)
Dept: PEDIATRICS | Facility: OTHER | Age: 54
End: 2024-02-14

## 2024-02-14 NOTE — TELEPHONE ENCOUNTER
Caller: Juan Kaur    Relationship: Self    Best call back number: [phone]  What orders are you requesting (i.e. lab or imaging): LABS    In what timeframe would the patient need to come in: BEFORE APPOINTMENT ON 03/27     Where will you receive your lab/imaging services: [facility]

## 2024-02-27 DIAGNOSIS — E78.2 MIXED HYPERLIPIDEMIA: ICD-10-CM

## 2024-02-27 DIAGNOSIS — E55.9 VITAMIN D DEFICIENCY: ICD-10-CM

## 2024-02-27 DIAGNOSIS — Z00.01 ENCOUNTER FOR WELL ADULT EXAM WITH ABNORMAL FINDINGS: Primary | ICD-10-CM

## 2024-03-19 ENCOUNTER — TELEPHONE (OUTPATIENT)
Dept: FAMILY MEDICINE CLINIC | Facility: CLINIC | Age: 54
End: 2024-03-19
Payer: COMMERCIAL

## 2024-03-19 NOTE — TELEPHONE ENCOUNTER
Name: Juan Kaur    Relationship: Self    Best Callback Number: [phone] (Mobile)  (CAN LEAVE )     HUB PROVIDED THE RELAY MESSAGE FROM THE OFFICE   PATIENT HAS FURTHER QUESTIONS AND WOULD LIKE A CALL BACK AT THE FOLLOWING PHONE NUMBER [phone] (Mobile)    ADDITIONAL INFORMATION:  PATIENT WANTS TO KNOW IF SHE WILL NEED TO REDO LABS BECAUSE THE APPOINTMENT WILL GO TO JUNE. PLEASE ADVISE.

## 2024-03-19 NOTE — TELEPHONE ENCOUNTER
Hub to share     Pt can use the labs drawn until seen with , she needs  to reschedule her appointment still.

## 2024-03-19 NOTE — TELEPHONE ENCOUNTER
HUB TO RELAY:   Tried to call patient regarding her appointment on 03/27/24 with Dr. Katz. The provider will be out of the office and we are needing to reschedule. The patient did not answer, LVM.

## 2024-06-20 ENCOUNTER — TELEPHONE (OUTPATIENT)
Dept: FAMILY MEDICINE CLINIC | Facility: CLINIC | Age: 54
End: 2024-06-20

## 2024-06-20 ENCOUNTER — OFFICE VISIT (OUTPATIENT)
Dept: FAMILY MEDICINE CLINIC | Facility: CLINIC | Age: 54
End: 2024-06-20
Payer: COMMERCIAL

## 2024-06-20 VITALS
SYSTOLIC BLOOD PRESSURE: 120 MMHG | HEART RATE: 96 BPM | HEIGHT: 61 IN | OXYGEN SATURATION: 98 % | TEMPERATURE: 97.3 F | DIASTOLIC BLOOD PRESSURE: 80 MMHG | WEIGHT: 183 LBS | BODY MASS INDEX: 34.55 KG/M2

## 2024-06-20 DIAGNOSIS — Z78.0 POSTMENOPAUSAL: ICD-10-CM

## 2024-06-20 DIAGNOSIS — Z00.01 ENCOUNTER FOR WELL ADULT EXAM WITH ABNORMAL FINDINGS: Primary | ICD-10-CM

## 2024-06-20 DIAGNOSIS — E66.09 EXOGENOUS OBESITY: ICD-10-CM

## 2024-06-20 DIAGNOSIS — E55.9 VITAMIN D DEFICIENCY: ICD-10-CM

## 2024-06-20 DIAGNOSIS — E78.2 MIXED HYPERLIPIDEMIA: ICD-10-CM

## 2024-06-20 DIAGNOSIS — J30.1 SEASONAL ALLERGIC RHINITIS DUE TO POLLEN: ICD-10-CM

## 2024-06-20 PROCEDURE — 99396 PREV VISIT EST AGE 40-64: CPT | Performed by: FAMILY MEDICINE

## 2024-06-21 RX ORDER — MONTELUKAST SODIUM 10 MG/1
10 TABLET ORAL
Qty: 90 TABLET | Refills: 1 | Status: SHIPPED | OUTPATIENT
Start: 2024-06-21

## 2024-06-21 NOTE — PROGRESS NOTES
Subjective   Juan Kaur is a 54 y.o. female with   Chief Complaint   Patient presents with    Annual Exam   .    History of Present Illness   54-year-old white female here for routine complete physical exam.  Patient is up-to-date with her GYN exams and is not due until next year.  Last mammogram was performed in late August 2023 and was found to be normal.  Patient has not had colonoscopy and at this time is not prepared to do so.  She will notify the office when she would like to have that arranged.  Past medical history includes seasonal allergic rhinitis, exogenous obesity as well as vitamin D deficiency.  Current medications include over-the-counter Zyrtec in combination with Flonase nasal spray.  She does use prescriptive Singulair.  She also uses a host of supplements and vitamins.  All medications and supplements are used appropriately and are well-tolerated without side effects.  Fasting labs have been acquired prior to this visit.  The following portions of the patient's history were reviewed and updated as appropriate: allergies, current medications, past family history, past medical history, past social history, past surgical history and problem list.    Review of Systems   Endocrine:        Vitamin D deficiency, glucose intolerance   Allergic/Immunologic: Positive for environmental allergies.       Objective     Vitals:    06/20/24 1530   BP: 120/80   Pulse: 96   Temp: 97.3 °F (36.3 °C)   SpO2: 98%       No results found for this or any previous visit (from the past 672 hour(s)).    Physical Exam  Vitals and nursing note reviewed.   Constitutional:       General: She is not in acute distress.     Appearance: Normal appearance. She is well-developed and well-groomed. She is obese.      Comments: Exogenous obesity with a BMI of 34.6   HENT:      Head: Normocephalic and atraumatic.      Right Ear: Hearing, tympanic membrane, ear canal and external ear normal.      Left Ear: Hearing, tympanic membrane,  ear canal and external ear normal.      Nose: Nose normal.      Mouth/Throat:      Lips: Pink.      Mouth: Mucous membranes are moist.      Dentition: Normal dentition.      Tongue: No lesions. Tongue does not deviate from midline.      Palate: No mass and lesions.      Pharynx: Oropharynx is clear. Uvula midline.   Eyes:      General: Lids are normal. No scleral icterus.     Extraocular Movements: Extraocular movements intact.      Conjunctiva/sclera: Conjunctivae normal.      Pupils: Pupils are equal, round, and reactive to light.      Funduscopic exam:     Right eye: No hemorrhage, exudate, AV nicking or papilledema.         Left eye: No hemorrhage, exudate, AV nicking or papilledema.   Neck:      Thyroid: No thyroid mass or thyromegaly.      Vascular: No carotid bruit or JVD.      Trachea: Trachea normal.   Cardiovascular:      Rate and Rhythm: Normal rate and regular rhythm.      Chest Wall: PMI is not displaced.      Pulses: Normal pulses.           Carotid pulses are 2+ on the right side and 2+ on the left side.       Radial pulses are 2+ on the right side and 2+ on the left side.      Heart sounds: Normal heart sounds, S1 normal and S2 normal. No murmur heard.     No friction rub. No gallop.   Pulmonary:      Effort: Pulmonary effort is normal.      Breath sounds: Normal breath sounds. No decreased breath sounds, wheezing, rhonchi or rales.   Abdominal:      General: Abdomen is flat. Bowel sounds are normal. There is no distension.      Palpations: Abdomen is soft. Abdomen is not rigid. There is no hepatomegaly, splenomegaly or mass.      Tenderness: There is no abdominal tenderness. There is no right CVA tenderness, left CVA tenderness, guarding or rebound.      Hernia: No hernia is present.   Musculoskeletal:         General: No tenderness or deformity. Normal range of motion.      Cervical back: Normal range of motion and neck supple. No muscular tenderness. Normal range of motion.   Lymphadenopathy:       Cervical: No cervical adenopathy.   Skin:     General: Skin is warm and dry.      Findings: No rash.   Neurological:      Mental Status: She is alert and oriented to person, place, and time.      Cranial Nerves: No cranial nerve deficit.      Sensory: Sensation is intact. No sensory deficit.      Motor: Motor function is intact. No tremor or abnormal muscle tone.      Coordination: Coordination is intact. Coordination normal.      Gait: Gait is intact. Gait normal.      Deep Tendon Reflexes: Reflexes are normal and symmetric. Reflexes normal.      Reflex Scores:       Bicep reflexes are 2+ on the right side and 2+ on the left side.       Brachioradialis reflexes are 2+ on the right side and 2+ on the left side.       Patellar reflexes are 2+ on the right side and 2+ on the left side.  Psychiatric:         Attention and Perception: Attention and perception normal.         Mood and Affect: Mood and affect normal.         Speech: Speech normal.         Behavior: Behavior normal. Behavior is cooperative.         Thought Content: Thought content normal.         Cognition and Memory: Cognition and memory normal.         Judgment: Judgment normal.         Assessment & Plan   Diagnoses and all orders for this visit:    1. Encounter for well adult exam with abnormal findings (Primary)  -     Comprehensive metabolic panel; Future  -     Vitamin D 25 hydroxy; Future  -     TSH; Future  -     Lipid panel; Future  -     CBC w AUTO Differential; Future    2. Mixed hyperlipidemia  -     Comprehensive metabolic panel; Future  -     Vitamin D 25 hydroxy; Future  -     TSH; Future  -     Lipid panel; Future  -     CBC w AUTO Differential; Future    3. Exogenous obesity  -     Comprehensive metabolic panel; Future  -     Vitamin D 25 hydroxy; Future  -     TSH; Future  -     Lipid panel; Future  -     CBC w AUTO Differential; Future    4. Vitamin D deficiency  -     Comprehensive metabolic panel; Future  -     Vitamin D 25 hydroxy;  Future  -     TSH; Future  -     Lipid panel; Future  -     CBC w AUTO Differential; Future    5. Seasonal allergic rhinitis due to pollen  -     montelukast (SINGULAIR) 10 MG tablet; Take 1 tablet by mouth every night at bedtime.  Dispense: 90 tablet; Refill: 1  -     Comprehensive metabolic panel; Future  -     Vitamin D 25 hydroxy; Future  -     TSH; Future  -     Lipid panel; Future  -     CBC w AUTO Differential; Future    6. Postmenopausal  -     Comprehensive metabolic panel; Future  -     Vitamin D 25 hydroxy; Future  -     TSH; Future  -     Lipid panel; Future  -     CBC w AUTO Differential; Future    Weight loss would be beneficial.  Has been recommended that patient wear seatbelts while driving and a bicycle, while riding a bicycle.  Have had long discussion on lowering cholesterol in her diet with an LDL goal of 130 or less.  Repeat fasting labs in 6 months with follow-up with me thereafter.  Allergy medicines will be continued without alteration.    Return in about 6 months (around 12/20/2024) for Recheck.

## 2024-06-24 DIAGNOSIS — F41.1 GENERALIZED ANXIETY DISORDER: Primary | ICD-10-CM

## 2024-06-24 RX ORDER — HYDROXYZINE PAMOATE 25 MG/1
25 CAPSULE ORAL 3 TIMES DAILY PRN
Qty: 90 CAPSULE | Refills: 0 | Status: SHIPPED | OUTPATIENT
Start: 2024-06-24

## 2024-10-28 ENCOUNTER — TRANSCRIBE ORDERS (OUTPATIENT)
Dept: ADMINISTRATIVE | Facility: HOSPITAL | Age: 54
End: 2024-10-28
Payer: COMMERCIAL

## 2024-10-28 DIAGNOSIS — Z12.31 BREAST CANCER SCREENING BY MAMMOGRAM: Primary | ICD-10-CM

## 2024-12-12 ENCOUNTER — OFFICE VISIT (OUTPATIENT)
Dept: FAMILY MEDICINE CLINIC | Facility: CLINIC | Age: 54
End: 2024-12-12
Payer: COMMERCIAL

## 2024-12-12 VITALS
OXYGEN SATURATION: 98 % | HEART RATE: 97 BPM | WEIGHT: 175 LBS | HEIGHT: 61 IN | BODY MASS INDEX: 33.04 KG/M2 | TEMPERATURE: 97.3 F | DIASTOLIC BLOOD PRESSURE: 78 MMHG | SYSTOLIC BLOOD PRESSURE: 122 MMHG

## 2024-12-12 DIAGNOSIS — J01.00 ACUTE NON-RECURRENT MAXILLARY SINUSITIS: Primary | ICD-10-CM

## 2024-12-12 DIAGNOSIS — J03.90 TONSILLITIS: ICD-10-CM

## 2024-12-12 LAB
EXPIRATION DATE: NORMAL
INTERNAL CONTROL: NORMAL
Lab: NORMAL
S PYO AG THROAT QL: NEGATIVE

## 2024-12-12 PROCEDURE — 99213 OFFICE O/P EST LOW 20 MIN: CPT | Performed by: FAMILY MEDICINE

## 2024-12-12 PROCEDURE — 87880 STREP A ASSAY W/OPTIC: CPT | Performed by: FAMILY MEDICINE

## 2024-12-12 RX ORDER — CEFDINIR 300 MG/1
300 CAPSULE ORAL 2 TIMES DAILY
Qty: 20 CAPSULE | Refills: 0 | Status: SHIPPED | OUTPATIENT
Start: 2024-12-12

## 2024-12-13 NOTE — PROGRESS NOTES
Subjective   Juan Kaur is a 54 y.o. female with   Chief Complaint   Patient presents with    Sore Throat     White spots on throat, red and swollen, X7-10 days on and off   .    Sore Throat        54-year-old white female with 10-day history of increased sore throat with some congestion and fatigue.  Patient denies fever and there has been no loss of taste or smell.  She is observed purulent exudate on tonsils bilaterally with enlargement of tonsils.  She was seen at an urgent care center with negative strep-patient was still placed on amoxicillin.  Symptoms persist without change.  There has been increased fatigue.  She had mono in college.  The following portions of the patient's history were reviewed and updated as appropriate: allergies, current medications, past family history, past medical history, past social history, past surgical history and problem list.    Review of Systems   HENT:  Positive for sore throat.        Objective     Vitals:    12/12/24 1100   BP: 122/78   Pulse: 97   Temp: 97.3 °F (36.3 °C)   SpO2: 98%       Recent Results (from the past 4 weeks)   POC Rapid Strep A    Collection Time: 12/12/24 12:04 PM    Specimen: Swab   Result Value Ref Range    Rapid Strep A Screen Negative Negative, VALID, INVALID, Not Performed    Internal Control Passed Passed    Lot Number [id number]     Expiration Date 11-        Physical Exam  Vitals and nursing note reviewed.   Constitutional:       Appearance: Normal appearance. She is well-developed and well-groomed.   HENT:      Head: Normocephalic and atraumatic.      Right Ear: Hearing, tympanic membrane, ear canal and external ear normal.      Left Ear: Hearing, tympanic membrane, ear canal and external ear normal.      Nose: Nose normal.      Mouth/Throat:      Lips: Pink.      Mouth: Mucous membranes are moist.      Pharynx: Uvula midline. Pharyngeal swelling, oropharyngeal exudate and posterior oropharyngeal erythema present.      Comments:  Positive tonsillar enlargement with right greater than left and positive exudate on tonsils bilaterally  Neck:      Thyroid: No thyroid mass or thyromegaly.      Vascular: Normal carotid pulses. No carotid bruit.      Trachea: Trachea and phonation normal.   Cardiovascular:      Rate and Rhythm: Normal rate and regular rhythm.      Heart sounds: Normal heart sounds. No murmur heard.     No friction rub. No gallop.   Pulmonary:      Effort: Pulmonary effort is normal. No respiratory distress.      Breath sounds: Normal breath sounds. No decreased breath sounds, wheezing, rhonchi or rales.   Musculoskeletal:      Cervical back: Neck supple.   Lymphadenopathy:      Cervical: Cervical adenopathy present.      Comments: Positive anterior cervical adenopathy with no evidence of posterior cervical adenopathy.   Skin:     General: Skin is warm and dry.      Findings: No rash.   Neurological:      Mental Status: She is alert and oriented to person, place, and time.   Psychiatric:         Attention and Perception: Attention and perception normal.         Mood and Affect: Mood and affect normal.         Speech: Speech normal.         Behavior: Behavior normal. Behavior is cooperative.         Thought Content: Thought content normal.         Cognition and Memory: Cognition and memory normal.         Judgment: Judgment normal.         Assessment & Plan   Diagnoses and all orders for this visit:    1. Acute non-recurrent maxillary sinusitis (Primary)  -     cefdinir (OMNICEF) 300 MG capsule; Take 1 capsule by mouth 2 (Two) Times a Day.  Dispense: 20 capsule; Refill: 0    2. Tonsillitis  -     POC Rapid Strep A  -     cefdinir (OMNICEF) 300 MG capsule; Take 1 capsule by mouth 2 (Two) Times a Day.  Dispense: 20 capsule; Refill: 0        Return if symptoms worsen or fail to improve.

## 2025-01-10 ENCOUNTER — HOSPITAL ENCOUNTER (OUTPATIENT)
Dept: MAMMOGRAPHY | Facility: HOSPITAL | Age: 55
Discharge: HOME | End: 2025-01-10
Admitting: FAMILY MEDICINE
Payer: COMMERCIAL

## 2025-01-10 DIAGNOSIS — Z12.31 BREAST CANCER SCREENING BY MAMMOGRAM: ICD-10-CM

## 2025-01-10 PROCEDURE — 77063 BREAST TOMOSYNTHESIS BI: CPT | Performed by: RADIOLOGY

## 2025-01-10 PROCEDURE — 77067 SCR MAMMO BI INCL CAD: CPT

## 2025-01-10 PROCEDURE — 77067 SCR MAMMO BI INCL CAD: CPT | Performed by: RADIOLOGY

## 2025-01-10 PROCEDURE — 77063 BREAST TOMOSYNTHESIS BI: CPT

## 2025-02-25 DIAGNOSIS — E55.9 VITAMIN D DEFICIENCY: ICD-10-CM

## 2025-02-25 DIAGNOSIS — E66.09 EXOGENOUS OBESITY: Primary | ICD-10-CM

## 2025-02-25 DIAGNOSIS — E78.2 MIXED HYPERLIPIDEMIA: ICD-10-CM

## 2025-02-25 DIAGNOSIS — F41.1 GENERALIZED ANXIETY DISORDER: ICD-10-CM

## 2025-02-28 LAB
25(OH)D3+25(OH)D2 SERPL-MCNC: 72.8 NG/ML (ref 30–100)
ALBUMIN SERPL-MCNC: 4.1 G/DL (ref 3.5–5.2)
ALBUMIN/GLOB SERPL: 1.5 G/DL
ALP SERPL-CCNC: 83 U/L (ref 39–117)
ALT SERPL-CCNC: 29 U/L (ref 1–33)
AST SERPL-CCNC: 23 U/L (ref 1–32)
BASOPHILS # BLD AUTO: 0.03 10*3/MM3 (ref 0–0.2)
BASOPHILS NFR BLD AUTO: 0.6 % (ref 0–1.5)
BILIRUB SERPL-MCNC: 0.4 MG/DL (ref 0–1.2)
BUN SERPL-MCNC: 18 MG/DL (ref 6–20)
BUN/CREAT SERPL: 19.6 (ref 7–25)
CALCIUM SERPL-MCNC: 9.7 MG/DL (ref 8.6–10.5)
CHLORIDE SERPL-SCNC: 107 MMOL/L (ref 98–107)
CHOLEST SERPL-MCNC: 214 MG/DL (ref 0–200)
CO2 SERPL-SCNC: 26.1 MMOL/L (ref 22–29)
CREAT SERPL-MCNC: 0.92 MG/DL (ref 0.57–1)
EGFRCR SERPLBLD CKD-EPI 2021: 73.7 ML/MIN/1.73
EOSINOPHIL # BLD AUTO: 0.19 10*3/MM3 (ref 0–0.4)
EOSINOPHIL NFR BLD AUTO: 3.8 % (ref 0.3–6.2)
ERYTHROCYTE [DISTWIDTH] IN BLOOD BY AUTOMATED COUNT: 12.3 % (ref 12.3–15.4)
GLOBULIN SER CALC-MCNC: 2.8 GM/DL
GLUCOSE SERPL-MCNC: 83 MG/DL (ref 65–99)
HCT VFR BLD AUTO: 43.3 % (ref 34–46.6)
HDLC SERPL-MCNC: 62 MG/DL (ref 40–60)
HGB BLD-MCNC: 14.9 G/DL (ref 12–15.9)
IMM GRANULOCYTES # BLD AUTO: 0.01 10*3/MM3 (ref 0–0.05)
IMM GRANULOCYTES NFR BLD AUTO: 0.2 % (ref 0–0.5)
LDLC SERPL CALC-MCNC: 143 MG/DL (ref 0–100)
LYMPHOCYTES # BLD AUTO: 1.36 10*3/MM3 (ref 0.7–3.1)
LYMPHOCYTES NFR BLD AUTO: 27.2 % (ref 19.6–45.3)
MCH RBC QN AUTO: 29.6 PG (ref 26.6–33)
MCHC RBC AUTO-ENTMCNC: 34.4 G/DL (ref 31.5–35.7)
MCV RBC AUTO: 86.1 FL (ref 79–97)
MONOCYTES # BLD AUTO: 0.43 10*3/MM3 (ref 0.1–0.9)
MONOCYTES NFR BLD AUTO: 8.6 % (ref 5–12)
NEUTROPHILS # BLD AUTO: 2.98 10*3/MM3 (ref 1.7–7)
NEUTROPHILS NFR BLD AUTO: 59.6 % (ref 42.7–76)
NRBC BLD AUTO-RTO: 0 /100 WBC (ref 0–0.2)
PLATELET # BLD AUTO: 256 10*3/MM3 (ref 140–450)
POTASSIUM SERPL-SCNC: 4.4 MMOL/L (ref 3.5–5.2)
PROT SERPL-MCNC: 6.9 G/DL (ref 6–8.5)
RBC # BLD AUTO: 5.03 10*6/MM3 (ref 3.77–5.28)
SODIUM SERPL-SCNC: 142 MMOL/L (ref 136–145)
TRIGL SERPL-MCNC: 53 MG/DL (ref 0–150)
TSH SERPL DL<=0.005 MIU/L-ACNC: 1.76 UIU/ML (ref 0.27–4.2)
VLDLC SERPL CALC-MCNC: 9 MG/DL (ref 5–40)
WBC # BLD AUTO: 5 10*3/MM3 (ref 3.4–10.8)

## 2025-06-12 DIAGNOSIS — Z13.29 SCREENING FOR THYROID DISORDER: ICD-10-CM

## 2025-06-12 DIAGNOSIS — E55.9 VITAMIN D DEFICIENCY: ICD-10-CM

## 2025-06-12 DIAGNOSIS — E78.2 MIXED HYPERLIPIDEMIA: ICD-10-CM

## 2025-06-12 DIAGNOSIS — F41.1 GENERALIZED ANXIETY DISORDER: ICD-10-CM

## 2025-06-12 DIAGNOSIS — Z00.00 ANNUAL PHYSICAL EXAM: ICD-10-CM

## 2025-06-12 DIAGNOSIS — E66.09 EXOGENOUS OBESITY: Primary | ICD-10-CM

## 2025-06-17 LAB
25(OH)D3+25(OH)D2 SERPL-MCNC: 81.3 NG/ML (ref 30–100)
ALBUMIN SERPL-MCNC: 4 G/DL (ref 3.8–4.9)
ALP SERPL-CCNC: 76 IU/L (ref 44–121)
ALT SERPL-CCNC: 20 IU/L (ref 0–32)
APPEARANCE UR: CLEAR
AST SERPL-CCNC: 19 IU/L (ref 0–40)
BASOPHILS # BLD AUTO: 0 X10E3/UL (ref 0–0.2)
BASOPHILS NFR BLD AUTO: 1 %
BILIRUB SERPL-MCNC: 0.4 MG/DL (ref 0–1.2)
BILIRUB UR QL STRIP: NEGATIVE
BUN SERPL-MCNC: 18 MG/DL (ref 6–24)
BUN/CREAT SERPL: 21 (ref 9–23)
CALCIUM SERPL-MCNC: 9.2 MG/DL (ref 8.7–10.2)
CHLORIDE SERPL-SCNC: 105 MMOL/L (ref 96–106)
CHOLEST SERPL-MCNC: 204 MG/DL (ref 100–199)
CO2 SERPL-SCNC: 23 MMOL/L (ref 20–29)
COLOR UR: YELLOW
CREAT SERPL-MCNC: 0.85 MG/DL (ref 0.57–1)
EGFRCR SERPLBLD CKD-EPI 2021: 81 ML/MIN/1.73
EOSINOPHIL # BLD AUTO: 0.3 X10E3/UL (ref 0–0.4)
EOSINOPHIL NFR BLD AUTO: 5 %
ERYTHROCYTE [DISTWIDTH] IN BLOOD BY AUTOMATED COUNT: 12.7 % (ref 11.7–15.4)
GLOBULIN SER CALC-MCNC: 2.6 G/DL (ref 1.5–4.5)
GLUCOSE SERPL-MCNC: 78 MG/DL (ref 70–99)
GLUCOSE UR QL STRIP: NEGATIVE
HCT VFR BLD AUTO: 43.9 % (ref 34–46.6)
HDLC SERPL-MCNC: 56 MG/DL
HGB BLD-MCNC: 14.3 G/DL (ref 11.1–15.9)
HGB UR QL STRIP: NEGATIVE
IMM GRANULOCYTES # BLD AUTO: 0 X10E3/UL (ref 0–0.1)
IMM GRANULOCYTES NFR BLD AUTO: 0 %
KETONES UR QL STRIP: ABNORMAL
LDLC SERPL CALC-MCNC: 136 MG/DL (ref 0–99)
LEUKOCYTE ESTERASE UR QL STRIP: NEGATIVE
LYMPHOCYTES # BLD AUTO: 1.4 X10E3/UL (ref 0.7–3.1)
LYMPHOCYTES NFR BLD AUTO: 27 %
MCH RBC QN AUTO: 29.4 PG (ref 26.6–33)
MCHC RBC AUTO-ENTMCNC: 32.6 G/DL (ref 31.5–35.7)
MCV RBC AUTO: 90 FL (ref 79–97)
MICRO URNS: ABNORMAL
MONOCYTES # BLD AUTO: 0.4 X10E3/UL (ref 0.1–0.9)
MONOCYTES NFR BLD AUTO: 8 %
NEUTROPHILS # BLD AUTO: 3.1 X10E3/UL (ref 1.4–7)
NEUTROPHILS NFR BLD AUTO: 59 %
NITRITE UR QL STRIP: NEGATIVE
PH UR STRIP: 5.5 [PH] (ref 5–7.5)
PLATELET # BLD AUTO: 252 X10E3/UL (ref 150–450)
POTASSIUM SERPL-SCNC: 4.3 MMOL/L (ref 3.5–5.2)
PROT SERPL-MCNC: 6.6 G/DL (ref 6–8.5)
PROT UR QL STRIP: NEGATIVE
RBC # BLD AUTO: 4.86 X10E6/UL (ref 3.77–5.28)
SODIUM SERPL-SCNC: 141 MMOL/L (ref 134–144)
SP GR UR STRIP: 1.03 (ref 1–1.03)
TRIGL SERPL-MCNC: 68 MG/DL (ref 0–149)
TSH SERPL DL<=0.005 MIU/L-ACNC: 2.13 UIU/ML (ref 0.45–4.5)
UROBILINOGEN UR STRIP-MCNC: 0.2 MG/DL (ref 0.2–1)
VLDLC SERPL CALC-MCNC: 12 MG/DL (ref 5–40)
WBC # BLD AUTO: 5.2 X10E3/UL (ref 3.4–10.8)

## 2025-06-23 ENCOUNTER — OFFICE VISIT (OUTPATIENT)
Dept: FAMILY MEDICINE CLINIC | Facility: CLINIC | Age: 55
End: 2025-06-23
Payer: COMMERCIAL

## 2025-06-23 VITALS
DIASTOLIC BLOOD PRESSURE: 72 MMHG | OXYGEN SATURATION: 98 % | BODY MASS INDEX: 31.38 KG/M2 | HEIGHT: 61 IN | TEMPERATURE: 97.1 F | SYSTOLIC BLOOD PRESSURE: 120 MMHG | HEART RATE: 64 BPM | WEIGHT: 166.2 LBS

## 2025-06-23 DIAGNOSIS — Z78.0 POSTMENOPAUSAL: ICD-10-CM

## 2025-06-23 DIAGNOSIS — J30.1 SEASONAL ALLERGIC RHINITIS DUE TO POLLEN: ICD-10-CM

## 2025-06-23 DIAGNOSIS — F41.1 GENERALIZED ANXIETY DISORDER: ICD-10-CM

## 2025-06-23 DIAGNOSIS — Z01.419 WOMEN'S ANNUAL ROUTINE GYNECOLOGICAL EXAMINATION: ICD-10-CM

## 2025-06-23 DIAGNOSIS — E55.9 VITAMIN D DEFICIENCY: ICD-10-CM

## 2025-06-23 DIAGNOSIS — E66.09 EXOGENOUS OBESITY: ICD-10-CM

## 2025-06-23 DIAGNOSIS — E78.2 MIXED HYPERLIPIDEMIA: ICD-10-CM

## 2025-06-23 DIAGNOSIS — Z23 NEED FOR DIPHTHERIA-TETANUS-PERTUSSIS (TDAP) VACCINE: ICD-10-CM

## 2025-06-23 DIAGNOSIS — Z00.01 ENCOUNTER FOR WELL ADULT EXAM WITH ABNORMAL FINDINGS: Primary | ICD-10-CM

## 2025-06-23 PROCEDURE — 99396 PREV VISIT EST AGE 40-64: CPT | Performed by: FAMILY MEDICINE

## 2025-06-23 PROCEDURE — 90715 TDAP VACCINE 7 YRS/> IM: CPT | Performed by: FAMILY MEDICINE

## 2025-06-23 PROCEDURE — 90471 IMMUNIZATION ADMIN: CPT | Performed by: FAMILY MEDICINE

## 2025-06-23 RX ORDER — MULTIPLE VITAMINS W/ MINERALS TAB 9MG-400MCG
1 TAB ORAL DAILY
COMMUNITY

## 2025-06-23 RX ORDER — MULTIVITAMIN WITH IRON
500 TABLET ORAL DAILY
COMMUNITY

## 2025-06-23 NOTE — PROGRESS NOTES
Subjective   Juan Kaur is a 55 y.o. woman who comes in today for a  pap smear only. Patient is a  3 para 3 Ab 0 with last menstrual period of approximately 2022 ago.  Her most recent annual exam was more than 3 years ago ago and showed no abnormalities. Previous abnormal Pap smears: no. Contraception: vasectomy. Last mammogram 2024. Last Dexa scan 2022-normal bone.  Past medical history includes hyperlipidemia, exogenous obesity as well as vitamin D deficiency.  There is also history of GERD as well as generalized anxiety disorder.  Current medications include hydroxyzine hydrochloride, Singulair, Zyrtec as well as Flonase nasal spray.  Patient is also on a host of supplements and vitamins.  All medications and vitamins are used appropriately and well-tolerated without side effects.  Fasting labs have been acquired prior to this visit.  Last colonoscopy has been several years ago with Dr. Hawkins.  Patient understands that she is due and actually will make a phone call to schedule that appointment.    The following portions of the patient's history were reviewed and updated as appropriate: allergies, current medications, past family history, past medical history, past social history, past surgical history, and problem list.    Review of Systems  Pertinent items are noted in HPI.    neck is supple without adenopathy or thyromegaly.  Carotid upstrokes +2 and symmetrical with no evidence of bruit bilaterally.  Heart regular rhythm without murmur gallop or rub.  Lungs clear to auscultation with good bilateral breath sounds.  Abdomen is soft and flat with positive normoactive bowel sounds.  There is no evidence of mass organomegaly.  No evidence of tenderness with deep palpation.  No evidence of guarding or rebound.  Distal pulses are +2 and symmetrical.  Breasts are symmetrical with nipples that are inverted/everted and with/without discharge.  Breasts are nontender and without skin  "changes.  There is no mass palpable.    Objective   /72 (BP Location: Left arm, Patient Position: Sitting, Cuff Size: Large Adult)   Pulse 64   Temp 97.1 °F (36.2 °C) (Infrared)   Ht 154.9 cm (60.98\")   Wt 75.4 kg (166 lb 3.2 oz)   SpO2 98%   BMI 31.42 kg/m²   Pelvic Exam: cervix normal in appearance, external genitalia normal, no adnexal masses or tenderness, no bladder tenderness, no cervical motion tenderness, perianal skin: no external genital warts noted, rectovaginal septum normal, urethra without abnormality or discharge, uterus normal size, shape, and consistency, and vagina normal without discharge. Pap smear obtained.  Orders Only on 06/12/2025   Component Date Value Ref Range Status    Glucose 06/16/2025 78  70 - 99 mg/dL Final    BUN 06/16/2025 18  6 - 24 mg/dL Final    Creatinine 06/16/2025 0.85  0.57 - 1.00 mg/dL Final    EGFR Result 06/16/2025 81  >59 mL/min/1.73 Final    BUN/Creatinine Ratio 06/16/2025 21  9 - 23 Final    Sodium 06/16/2025 141  134 - 144 mmol/L Final    Potassium 06/16/2025 4.3  3.5 - 5.2 mmol/L Final    Chloride 06/16/2025 105  96 - 106 mmol/L Final    Total CO2 06/16/2025 23  20 - 29 mmol/L Final    Calcium 06/16/2025 9.2  8.7 - 10.2 mg/dL Final    Total Protein 06/16/2025 6.6  6.0 - 8.5 g/dL Final    Albumin 06/16/2025 4.0  3.8 - 4.9 g/dL Final    Globulin 06/16/2025 2.6  1.5 - 4.5 g/dL Final    Total Bilirubin 06/16/2025 0.4  0.0 - 1.2 mg/dL Final    Alkaline Phosphatase 06/16/2025 76  44 - 121 IU/L Final    AST (SGOT) 06/16/2025 19  0 - 40 IU/L Final    ALT (SGPT) 06/16/2025 20  0 - 32 IU/L Final    TSH 06/16/2025 2.130  0.450 - 4.500 uIU/mL Final    25 Hydroxy, Vitamin D 06/16/2025 81.3  30.0 - 100.0 ng/mL Final    Comment: Vitamin D deficiency has been defined by the [employer]  [employer] and an Endocrine Society practice guideline as a  level of serum 25-OH vitamin D less than 20 ng/mL (1,2).  The Endocrine Society went on to further define vitamin " D  insufficiency as a level between 21 and 29 ng/mL (2).  1. IOM ([employer]). 2010. Dietary reference     intakes for calcium and D. [place]: The     National Academies Press.  2. Rosibel MF, Malathi VARGHESE, Farzad RAYMOND, et al.     Evaluation, treatment, and prevention of vitamin D     deficiency: an Endocrine Society clinical practice     guideline. JCEM. 2011 Jul; 96(7):1911-30.      Total Cholesterol 06/16/2025 204 (H)  100 - 199 mg/dL Final    Triglycerides 06/16/2025 68  0 - 149 mg/dL Final    HDL Cholesterol 06/16/2025 56  >39 mg/dL Final    VLDL Cholesterol George 06/16/2025 12  5 - 40 mg/dL Final    LDL Chol Calc (NIH) 06/16/2025 136 (H)  0 - 99 mg/dL Final    WBC 06/16/2025 5.2  3.4 - 10.8 x10E3/uL Final    RBC 06/16/2025 4.86  3.77 - 5.28 x10E6/uL Final    Hemoglobin 06/16/2025 14.3  11.1 - 15.9 g/dL Final    Hematocrit 06/16/2025 43.9  34.0 - 46.6 % Final    MCV 06/16/2025 90  79 - 97 fL Final    MCH 06/16/2025 29.4  26.6 - 33.0 pg Final    MCHC 06/16/2025 32.6  31.5 - 35.7 g/dL Final    RDW 06/16/2025 12.7  11.7 - 15.4 % Final    Platelets 06/16/2025 252  150 - 450 x10E3/uL Final    Neutrophil Rel % 06/16/2025 59  Not Estab. % Final    Lymphocyte Rel % 06/16/2025 27  Not Estab. % Final    Monocyte Rel % 06/16/2025 8  Not Estab. % Final    Eosinophil Rel % 06/16/2025 5  Not Estab. % Final    Basophil Rel % 06/16/2025 1  Not Estab. % Final    Neutrophils Absolute 06/16/2025 3.1  1.4 - 7.0 x10E3/uL Final    Lymphocytes Absolute 06/16/2025 1.4  0.7 - 3.1 x10E3/uL Final    Monocytes Absolute 06/16/2025 0.4  0.1 - 0.9 x10E3/uL Final    Eosinophils Absolute 06/16/2025 0.3  0.0 - 0.4 x10E3/uL Final    Basophils Absolute 06/16/2025 0.0  0.0 - 0.2 x10E3/uL Final    Immature Granulocyte Rel % 06/16/2025 0  Not Estab. % Final    Immature Grans Absolute 06/16/2025 0.0  0.0 - 0.1 x10E3/uL Final    Specific Gravity, UA 06/16/2025 1.026  1.005 - 1.030 Final    pH, UA 06/16/2025 5.5  5.0 - 7.5 Final     Color, UA 06/16/2025 Yellow  Yellow Final    Appearance, UA 06/16/2025 Clear  Clear Final    Leukocytes, UA 06/16/2025 Negative  Negative Final    Protein 06/16/2025 Negative  Negative/Trace Final    Glucose, UA 06/16/2025 Negative  Negative Final    Ketones 06/16/2025 Trace (A)  Negative Final    Blood, UA 06/16/2025 Negative  Negative Final    Bilirubin, UA 06/16/2025 Negative  Negative Final    Urobilinogen, UA 06/16/2025 0.2  0.2 - 1.0 mg/dL Final    Nitrite, UA 06/16/2025 Negative  Negative Final    Microscopic Examination 06/16/2025 Comment   Final    Microscopic not indicated and not performed.       Assessment & Plan   Screening pap smear.    Follow up in 3 years, or as indicated by Pap results.    Diagnoses and all orders for this visit:    1. Encounter for well adult exam with abnormal findings (Primary)  -     Lipid panel; Future  -     Comprehensive metabolic panel; Future  -     TSH; Future  -     Vitamin D 25 hydroxy; Future  -     CBC w AUTO Differential; Future    2. Women's annual routine gynecological examination  -     IGP, Rfx Aptima HPV ASCU  -     Lipid panel; Future  -     Comprehensive metabolic panel; Future  -     TSH; Future  -     Vitamin D 25 hydroxy; Future  -     CBC w AUTO Differential; Future    3. Need for diphtheria-tetanus-pertussis (Tdap) vaccine  -     Tdap Vaccine Greater Than or Equal To 8yo IM  -     Lipid panel; Future  -     Comprehensive metabolic panel; Future  -     TSH; Future  -     Vitamin D 25 hydroxy; Future  -     CBC w AUTO Differential; Future    4. Mixed hyperlipidemia  -     Lipid panel; Future  -     Comprehensive metabolic panel; Future  -     TSH; Future  -     Vitamin D 25 hydroxy; Future  -     CBC w AUTO Differential; Future    5. Exogenous obesity  -     Lipid panel; Future  -     Comprehensive metabolic panel; Future  -     TSH; Future  -     Vitamin D 25 hydroxy; Future  -     CBC w AUTO Differential; Future    6. Vitamin D deficiency  -     Lipid  panel; Future  -     Comprehensive metabolic panel; Future  -     TSH; Future  -     Vitamin D 25 hydroxy; Future  -     CBC w AUTO Differential; Future    7. Seasonal allergic rhinitis due to pollen  -     Lipid panel; Future  -     Comprehensive metabolic panel; Future  -     TSH; Future  -     Vitamin D 25 hydroxy; Future  -     CBC w AUTO Differential; Future    8. Postmenopausal  -     Lipid panel; Future  -     Comprehensive metabolic panel; Future  -     TSH; Future  -     Vitamin D 25 hydroxy; Future  -     CBC w AUTO Differential; Future    9. Generalized anxiety disorder  -     Lipid panel; Future  -     Comprehensive metabolic panel; Future  -     TSH; Future  -     Vitamin D 25 hydroxy; Future  -     CBC w AUTO Differential; Future    Patient has been asked to lower cholesterol in her diet with a goal of LDL less than 130.  Self breast exam was taught and encouraged on a monthly basis.  It has been recommended she wear seatbelts while driving and a bicycle helmet while riding a bicycle.  Anticipate repeat fasting labs in 6 months with follow-up with me thereafter.      Current Outpatient Medications:     cetirizine (ZyrTEC) 10 MG tablet, Take by mouth., Disp: , Rfl:     Cholecalciferol (VITAMIN D3) 5000 units capsule capsule, Take 1 capsule by mouth Daily., Disp: , Rfl:     ELDERBERRY PO, Take  by mouth., Disp: , Rfl:     fluticasone (FLONASE) 50 MCG/ACT nasal spray, Administer 2 sprays into the nostril(s) as directed by provider Daily., Disp: , Rfl:     hydrOXYzine pamoate (Vistaril) 25 MG capsule, Take 1 capsule by mouth 3 (Three) Times a Day As Needed for Itching., Disp: 90 capsule, Rfl: 0    montelukast (SINGULAIR) 10 MG tablet, Take 1 tablet by mouth every night at bedtime., Disp: 90 tablet, Rfl: 1    multivitamin with minerals tablet tablet, Take 1 tablet by mouth Daily., Disp: , Rfl:     Probiotic Product (PROBIOTIC-10 PO), Take  by mouth., Disp: , Rfl:     vitamin B-12 (CYANOCOBALAMIN) 500 MCG  tablet, Take 1 tablet by mouth Daily., Disp: , Rfl:     vitamin C (ASCORBIC ACID) 250 MG tablet, Take 1 tablet by mouth Daily., Disp: , Rfl:     Zinc Sulfate (ZINC 15 PO), Take  by mouth., Disp: , Rfl:

## 2025-06-26 LAB
CONV .: NORMAL
CYTOLOGIST CVX/VAG CYTO: NORMAL
CYTOLOGY CVX/VAG DOC CYTO: NORMAL
CYTOLOGY CVX/VAG DOC THIN PREP: NORMAL
DX ICD CODE: NORMAL
OTHER STN SPEC: NORMAL
SERVICE CMNT-IMP: NORMAL
STAT OF ADQ CVX/VAG CYTO-IMP: NORMAL